# Patient Record
Sex: FEMALE | Race: WHITE
[De-identification: names, ages, dates, MRNs, and addresses within clinical notes are randomized per-mention and may not be internally consistent; named-entity substitution may affect disease eponyms.]

---

## 2018-03-21 ENCOUNTER — HOSPITAL ENCOUNTER (EMERGENCY)
Dept: HOSPITAL 92 - SCSER | Age: 63
Discharge: HOME | End: 2018-03-21
Payer: MEDICARE

## 2018-03-21 DIAGNOSIS — Z79.01: ICD-10-CM

## 2018-03-21 DIAGNOSIS — E11.9: ICD-10-CM

## 2018-03-21 DIAGNOSIS — S92.342A: ICD-10-CM

## 2018-03-21 DIAGNOSIS — K21.9: ICD-10-CM

## 2018-03-21 DIAGNOSIS — E78.5: ICD-10-CM

## 2018-03-21 DIAGNOSIS — Z85.41: ICD-10-CM

## 2018-03-21 DIAGNOSIS — Z79.4: ICD-10-CM
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
## 2018-03-21 DIAGNOSIS — I25.2: ICD-10-CM

## 2018-03-21 DIAGNOSIS — G43.909: ICD-10-CM

## 2018-03-21 DIAGNOSIS — Z79.899: ICD-10-CM
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
## 2018-03-21 DIAGNOSIS — F41.9: ICD-10-CM

## 2018-03-21 DIAGNOSIS — S92.332A: ICD-10-CM

## 2018-03-21 DIAGNOSIS — I10: ICD-10-CM

## 2018-03-21 DIAGNOSIS — F31.9: ICD-10-CM

## 2018-03-21 DIAGNOSIS — S92.322A: Primary | ICD-10-CM

## 2018-03-21 PROCEDURE — 28475 CLTX METATARSAL FX W/MNPJ EA: CPT

## 2018-03-21 PROCEDURE — 96372 THER/PROPH/DIAG INJ SC/IM: CPT

## 2018-05-27 ENCOUNTER — HOSPITAL ENCOUNTER (EMERGENCY)
Dept: HOSPITAL 92 - SCSER | Age: 63
Discharge: HOME | End: 2018-05-27
Payer: MEDICARE

## 2018-05-27 DIAGNOSIS — E78.5: ICD-10-CM

## 2018-05-27 DIAGNOSIS — Z79.4: ICD-10-CM

## 2018-05-27 DIAGNOSIS — Z79.01: ICD-10-CM

## 2018-05-27 DIAGNOSIS — G43.909: ICD-10-CM

## 2018-05-27 DIAGNOSIS — K21.9: ICD-10-CM

## 2018-05-27 DIAGNOSIS — Z85.41: ICD-10-CM

## 2018-05-27 DIAGNOSIS — E11.9: ICD-10-CM

## 2018-05-27 DIAGNOSIS — F32.9: ICD-10-CM

## 2018-05-27 DIAGNOSIS — F41.9: ICD-10-CM
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
## 2018-05-27 DIAGNOSIS — S22.41XA: Primary | ICD-10-CM

## 2018-05-27 DIAGNOSIS — Z79.899: ICD-10-CM

## 2018-05-27 DIAGNOSIS — I25.2: ICD-10-CM

## 2018-05-27 PROCEDURE — 96372 THER/PROPH/DIAG INJ SC/IM: CPT

## 2018-05-27 PROCEDURE — 64420 NJX AA&/STRD NTRCOST NRV 1: CPT

## 2018-05-27 PROCEDURE — 71250 CT THORAX DX C-: CPT

## 2018-05-31 ENCOUNTER — HOSPITAL ENCOUNTER (OUTPATIENT)
Dept: HOSPITAL 92 - BICMAMMO | Age: 63
Discharge: HOME | End: 2018-05-31
Attending: PHYSICIAN ASSISTANT
Payer: MEDICARE

## 2018-05-31 DIAGNOSIS — M85.88: ICD-10-CM

## 2018-05-31 DIAGNOSIS — M81.0: Primary | ICD-10-CM

## 2018-05-31 PROCEDURE — 77080 DXA BONE DENSITY AXIAL: CPT

## 2018-06-28 ENCOUNTER — HOSPITAL ENCOUNTER (EMERGENCY)
Dept: HOSPITAL 92 - SCSER | Age: 63
Discharge: HOME | End: 2018-06-28
Payer: MEDICARE

## 2018-06-28 ENCOUNTER — HOSPITAL ENCOUNTER (OUTPATIENT)
Dept: HOSPITAL 92 - SCSCT | Age: 63
Discharge: HOME | End: 2018-06-28
Attending: INTERNAL MEDICINE
Payer: MEDICARE

## 2018-06-28 DIAGNOSIS — R07.89: ICD-10-CM

## 2018-06-28 DIAGNOSIS — F41.9: ICD-10-CM

## 2018-06-28 DIAGNOSIS — F32.9: ICD-10-CM

## 2018-06-28 DIAGNOSIS — N39.0: Primary | ICD-10-CM

## 2018-06-28 DIAGNOSIS — D72.810: Primary | ICD-10-CM

## 2018-06-28 DIAGNOSIS — R59.0: ICD-10-CM

## 2018-06-28 DIAGNOSIS — E11.9: ICD-10-CM

## 2018-06-28 DIAGNOSIS — R11.2: ICD-10-CM

## 2018-06-28 DIAGNOSIS — K21.9: ICD-10-CM

## 2018-06-28 DIAGNOSIS — E78.5: ICD-10-CM

## 2018-06-28 LAB
ALBUMIN SERPL BCG-MCNC: 3.7 G/DL (ref 3.4–4.8)
ALP SERPL-CCNC: 81 U/L (ref 40–150)
ALT SERPL W P-5'-P-CCNC: 15 U/L (ref 8–55)
ANION GAP SERPL CALC-SCNC: 13 MMOL/L (ref 10–20)
AST SERPL-CCNC: 20 U/L (ref 5–34)
BACTERIA UR QL AUTO: (no result) HPF
BILIRUB SERPL-MCNC: 2 MG/DL (ref 0.2–1.2)
BUN SERPL-MCNC: 18 MG/DL (ref 9.8–20.1)
CALCIUM SERPL-MCNC: 8.9 MG/DL (ref 7.8–10.44)
CHLORIDE SERPL-SCNC: 105 MMOL/L (ref 98–107)
CK MB SERPL-MCNC: 0.9 NG/ML (ref 0–6.6)
CO2 SERPL-SCNC: 22 MMOL/L (ref 23–31)
CREAT CL PREDICTED SERPL C-G-VRATE: 0 ML/MIN (ref 70–130)
GLOBULIN SER CALC-MCNC: 3.2 G/DL (ref 2.4–3.5)
GLUCOSE SERPL-MCNC: 146 MG/DL (ref 80–115)
HGB BLD-MCNC: 13 G/DL (ref 12–16)
HYALINE CASTS #/AREA URNS LPF: (no result) LPF
LIPASE SERPL-CCNC: 18 U/L (ref 8–78)
MAGNESIUM SERPL-MCNC: 2.1 MG/DL (ref 1.6–2.6)
MCH RBC QN AUTO: 28.9 PG (ref 27–31)
MCV RBC AUTO: 81.7 FL (ref 78–98)
MDIFF COMPLETE?: YES
PLATELET # BLD AUTO: 163 THOU/UL (ref 130–400)
POTASSIUM SERPL-SCNC: 4.4 MMOL/L (ref 3.5–5.1)
PROT UR STRIP.AUTO-MCNC: (no result) MG/DL
RBC # BLD AUTO: 4.5 MILL/UL (ref 4.2–5.4)
RBC UR QL AUTO: (no result) HPF (ref 0–3)
SODIUM SERPL-SCNC: 136 MMOL/L (ref 136–145)
SP GR UR STRIP: 1.02 (ref 1–1.03)
TROPONIN I SERPL DL<=0.01 NG/ML-MCNC: (no result) NG/ML (ref ?–0.03)
WBC # BLD AUTO: 25.4 THOU/UL (ref 4.8–10.8)
WBC UR QL AUTO: (no result) HPF (ref 0–3)

## 2018-06-28 PROCEDURE — 81003 URINALYSIS AUTO W/O SCOPE: CPT

## 2018-06-28 PROCEDURE — 87086 URINE CULTURE/COLONY COUNT: CPT

## 2018-06-28 PROCEDURE — 87081 CULTURE SCREEN ONLY: CPT

## 2018-06-28 PROCEDURE — 80053 COMPREHEN METABOLIC PANEL: CPT

## 2018-06-28 PROCEDURE — 87040 BLOOD CULTURE FOR BACTERIA: CPT

## 2018-06-28 PROCEDURE — 84484 ASSAY OF TROPONIN QUANT: CPT

## 2018-06-28 PROCEDURE — 85025 COMPLETE CBC W/AUTO DIFF WBC: CPT

## 2018-06-28 PROCEDURE — 83690 ASSAY OF LIPASE: CPT

## 2018-06-28 PROCEDURE — 81015 MICROSCOPIC EXAM OF URINE: CPT

## 2018-06-28 PROCEDURE — 74177 CT ABD & PELVIS W/CONTRAST: CPT

## 2018-06-28 PROCEDURE — 83605 ASSAY OF LACTIC ACID: CPT

## 2018-06-28 PROCEDURE — 96361 HYDRATE IV INFUSION ADD-ON: CPT

## 2018-06-28 PROCEDURE — 87430 STREP A AG IA: CPT

## 2018-06-28 PROCEDURE — 96374 THER/PROPH/DIAG INJ IV PUSH: CPT

## 2018-06-28 PROCEDURE — 93005 ELECTROCARDIOGRAM TRACING: CPT

## 2018-06-28 PROCEDURE — 71045 X-RAY EXAM CHEST 1 VIEW: CPT

## 2018-06-28 PROCEDURE — 83735 ASSAY OF MAGNESIUM: CPT

## 2018-06-28 PROCEDURE — 82553 CREATINE MB FRACTION: CPT

## 2018-06-28 PROCEDURE — 96375 TX/PRO/DX INJ NEW DRUG ADDON: CPT

## 2018-08-15 ENCOUNTER — HOSPITAL ENCOUNTER (OUTPATIENT)
Dept: HOSPITAL 92 - BICMAMMO | Age: 63
Discharge: HOME | End: 2018-08-15
Attending: FAMILY MEDICINE
Payer: MEDICARE

## 2018-08-15 DIAGNOSIS — Z12.31: Primary | ICD-10-CM

## 2018-08-15 DIAGNOSIS — Z80.3: ICD-10-CM

## 2018-08-15 PROCEDURE — 77067 SCR MAMMO BI INCL CAD: CPT

## 2018-08-15 PROCEDURE — 77063 BREAST TOMOSYNTHESIS BI: CPT

## 2018-09-24 ENCOUNTER — HOSPITAL ENCOUNTER (EMERGENCY)
Dept: HOSPITAL 92 - SCSER | Age: 63
Discharge: HOME | End: 2018-09-24
Payer: MEDICARE

## 2018-09-24 DIAGNOSIS — F41.9: ICD-10-CM

## 2018-09-24 DIAGNOSIS — E78.5: ICD-10-CM

## 2018-09-24 DIAGNOSIS — K21.9: ICD-10-CM

## 2018-09-24 DIAGNOSIS — R05: Primary | ICD-10-CM

## 2018-09-24 DIAGNOSIS — G43.909: ICD-10-CM

## 2018-09-24 DIAGNOSIS — F32.9: ICD-10-CM

## 2018-09-24 DIAGNOSIS — E11.9: ICD-10-CM

## 2018-09-24 DIAGNOSIS — I95.9: ICD-10-CM

## 2018-09-24 LAB
ALBUMIN SERPL BCG-MCNC: 3.7 G/DL (ref 3.4–4.8)
ALP SERPL-CCNC: 119 U/L (ref 40–150)
ALT SERPL W P-5'-P-CCNC: 15 U/L (ref 8–55)
ANION GAP SERPL CALC-SCNC: 14 MMOL/L (ref 10–20)
AST SERPL-CCNC: 23 U/L (ref 5–34)
BILIRUB SERPL-MCNC: 0.7 MG/DL (ref 0.2–1.2)
BUN SERPL-MCNC: 19 MG/DL (ref 9.8–20.1)
CALCIUM SERPL-MCNC: 9.5 MG/DL (ref 7.8–10.44)
CHLORIDE SERPL-SCNC: 105 MMOL/L (ref 98–107)
CK MB SERPL-MCNC: 2.1 NG/ML (ref 0–6.6)
CO2 SERPL-SCNC: 22 MMOL/L (ref 23–31)
CREAT CL PREDICTED SERPL C-G-VRATE: 0 ML/MIN (ref 70–130)
GLOBULIN SER CALC-MCNC: 3.3 G/DL (ref 2.4–3.5)
GLUCOSE SERPL-MCNC: 328 MG/DL (ref 80–115)
HGB BLD-MCNC: 12.7 G/DL (ref 12–16)
MCH RBC QN AUTO: 28.5 PG (ref 27–31)
MCV RBC AUTO: 84.7 FL (ref 78–98)
MDIFF COMPLETE?: YES
PLATELET # BLD AUTO: 185 THOU/UL (ref 130–400)
PLATELET BLD QL SMEAR: (no result)
POTASSIUM SERPL-SCNC: 5.2 MMOL/L (ref 3.5–5.1)
RBC # BLD AUTO: 4.44 MILL/UL (ref 4.2–5.4)
SODIUM SERPL-SCNC: 136 MMOL/L (ref 136–145)
TROPONIN I SERPL DL<=0.01 NG/ML-MCNC: (no result) NG/ML (ref ?–0.03)
WBC # BLD AUTO: 21.5 THOU/UL (ref 4.8–10.8)

## 2018-09-24 PROCEDURE — 71046 X-RAY EXAM CHEST 2 VIEWS: CPT

## 2018-09-24 PROCEDURE — 80053 COMPREHEN METABOLIC PANEL: CPT

## 2018-09-24 PROCEDURE — 93005 ELECTROCARDIOGRAM TRACING: CPT

## 2018-09-24 PROCEDURE — 82553 CREATINE MB FRACTION: CPT

## 2018-09-24 PROCEDURE — 36415 COLL VENOUS BLD VENIPUNCTURE: CPT

## 2018-09-24 PROCEDURE — 85025 COMPLETE CBC W/AUTO DIFF WBC: CPT

## 2018-09-24 PROCEDURE — 84484 ASSAY OF TROPONIN QUANT: CPT

## 2019-01-03 ENCOUNTER — HOSPITAL ENCOUNTER (OUTPATIENT)
Dept: HOSPITAL 92 - BICULT | Age: 64
Discharge: HOME | End: 2019-01-03
Attending: INTERNAL MEDICINE
Payer: MEDICARE

## 2019-01-03 DIAGNOSIS — R16.1: Primary | ICD-10-CM

## 2019-01-03 DIAGNOSIS — D72.810: ICD-10-CM

## 2019-01-03 DIAGNOSIS — C91.10: ICD-10-CM

## 2019-01-03 PROCEDURE — 76705 ECHO EXAM OF ABDOMEN: CPT

## 2019-02-19 ENCOUNTER — HOSPITAL ENCOUNTER (EMERGENCY)
Dept: HOSPITAL 92 - SCSER | Age: 64
Discharge: HOME | End: 2019-02-19
Payer: MEDICARE

## 2019-02-19 DIAGNOSIS — N39.0: Primary | ICD-10-CM

## 2019-02-19 DIAGNOSIS — G43.909: ICD-10-CM

## 2019-02-19 DIAGNOSIS — F41.9: ICD-10-CM

## 2019-02-19 DIAGNOSIS — I95.9: ICD-10-CM

## 2019-02-19 DIAGNOSIS — R05: ICD-10-CM

## 2019-02-19 DIAGNOSIS — F32.9: ICD-10-CM

## 2019-02-19 DIAGNOSIS — K21.9: ICD-10-CM

## 2019-02-19 DIAGNOSIS — R09.81: ICD-10-CM

## 2019-02-19 DIAGNOSIS — E11.9: ICD-10-CM

## 2019-02-19 DIAGNOSIS — E78.5: ICD-10-CM

## 2019-02-19 LAB
ALBUMIN SERPL BCG-MCNC: 3.6 G/DL (ref 3.4–4.8)
ALP SERPL-CCNC: 83 U/L (ref 40–150)
ALT SERPL W P-5'-P-CCNC: 21 U/L (ref 8–55)
ANION GAP SERPL CALC-SCNC: 13 MMOL/L (ref 10–20)
AST SERPL-CCNC: 26 U/L (ref 5–34)
BACTERIA UR QL AUTO: (no result) HPF
BILIRUB SERPL-MCNC: 1.1 MG/DL (ref 0.2–1.2)
BUN SERPL-MCNC: 18 MG/DL (ref 9.8–20.1)
CALCIUM SERPL-MCNC: 9.6 MG/DL (ref 7.8–10.44)
CHLORIDE SERPL-SCNC: 107 MMOL/L (ref 98–107)
CO2 SERPL-SCNC: 23 MMOL/L (ref 23–31)
CREAT CL PREDICTED SERPL C-G-VRATE: 0 ML/MIN (ref 70–130)
GLOBULIN SER CALC-MCNC: 3 G/DL (ref 2.4–3.5)
GLUCOSE SERPL-MCNC: 129 MG/DL (ref 80–115)
HGB BLD-MCNC: 13.2 G/DL (ref 12–16)
HYALINE CASTS #/AREA URNS LPF: (no result) LPF
MCH RBC QN AUTO: 27.4 PG (ref 27–31)
MCV RBC AUTO: 85.3 FL (ref 78–98)
MDIFF COMPLETE?: YES
PLATELET # BLD AUTO: 203 THOU/UL (ref 130–400)
POTASSIUM SERPL-SCNC: 4.2 MMOL/L (ref 3.5–5.1)
PROT UR STRIP.AUTO-MCNC: 100 MG/DL
RBC # BLD AUTO: 4.81 MILL/UL (ref 4.2–5.4)
RBC UR QL AUTO: (no result) HPF (ref 0–3)
SODIUM SERPL-SCNC: 139 MMOL/L (ref 136–145)
SP GR UR STRIP: 1.01 (ref 1–1.03)
WBC # BLD AUTO: 34.4 THOU/UL (ref 4.8–10.8)
WBC UR QL AUTO: (no result) HPF (ref 0–3)

## 2019-02-19 PROCEDURE — 84484 ASSAY OF TROPONIN QUANT: CPT

## 2019-02-19 PROCEDURE — 87081 CULTURE SCREEN ONLY: CPT

## 2019-02-19 PROCEDURE — 85060 BLOOD SMEAR INTERPRETATION: CPT

## 2019-02-19 PROCEDURE — 80053 COMPREHEN METABOLIC PANEL: CPT

## 2019-02-19 PROCEDURE — 81015 MICROSCOPIC EXAM OF URINE: CPT

## 2019-02-19 PROCEDURE — 36415 COLL VENOUS BLD VENIPUNCTURE: CPT

## 2019-02-19 PROCEDURE — 87086 URINE CULTURE/COLONY COUNT: CPT

## 2019-02-19 PROCEDURE — 87430 STREP A AG IA: CPT

## 2019-02-19 PROCEDURE — 81003 URINALYSIS AUTO W/O SCOPE: CPT

## 2019-02-19 PROCEDURE — 87804 INFLUENZA ASSAY W/OPTIC: CPT

## 2019-02-19 PROCEDURE — 93005 ELECTROCARDIOGRAM TRACING: CPT

## 2019-02-19 PROCEDURE — 71046 X-RAY EXAM CHEST 2 VIEWS: CPT

## 2019-02-19 PROCEDURE — 85025 COMPLETE CBC W/AUTO DIFF WBC: CPT

## 2019-05-07 ENCOUNTER — HOSPITAL ENCOUNTER (OUTPATIENT)
Dept: HOSPITAL 92 - BICCT | Age: 64
Discharge: HOME | End: 2019-05-07
Attending: INTERNAL MEDICINE
Payer: MEDICARE

## 2019-05-07 DIAGNOSIS — C91.10: Primary | ICD-10-CM

## 2019-05-07 DIAGNOSIS — R10.13: ICD-10-CM

## 2019-05-07 DIAGNOSIS — R16.1: ICD-10-CM

## 2019-05-07 DIAGNOSIS — R59.0: ICD-10-CM

## 2019-05-07 DIAGNOSIS — K80.20: ICD-10-CM

## 2019-05-07 DIAGNOSIS — I70.90: ICD-10-CM

## 2019-05-07 DIAGNOSIS — R68.81: ICD-10-CM

## 2019-05-07 LAB — ESTIMATED GFR-MDRD - POC: 50

## 2019-05-07 PROCEDURE — 82565 ASSAY OF CREATININE: CPT

## 2019-05-07 PROCEDURE — 74177 CT ABD & PELVIS W/CONTRAST: CPT

## 2019-08-20 ENCOUNTER — HOSPITAL ENCOUNTER (OUTPATIENT)
Dept: HOSPITAL 92 - BICMAMMO | Age: 64
Discharge: HOME | End: 2019-08-20
Attending: FAMILY MEDICINE
Payer: MEDICARE

## 2019-08-20 DIAGNOSIS — Z85.6: ICD-10-CM

## 2019-08-20 DIAGNOSIS — Z12.31: Primary | ICD-10-CM

## 2019-08-20 PROCEDURE — 77063 BREAST TOMOSYNTHESIS BI: CPT

## 2019-08-20 PROCEDURE — 77067 SCR MAMMO BI INCL CAD: CPT

## 2020-03-12 ENCOUNTER — HOSPITAL ENCOUNTER (INPATIENT)
Dept: HOSPITAL 92 - ERS | Age: 65
LOS: 12 days | Discharge: HOME HEALTH | DRG: 853 | End: 2020-03-24
Attending: HOSPITALIST | Admitting: HOSPITALIST
Payer: MEDICARE

## 2020-03-12 VITALS — BODY MASS INDEX: 27.4 KG/M2

## 2020-03-12 DIAGNOSIS — Z79.01: ICD-10-CM

## 2020-03-12 DIAGNOSIS — Z79.899: ICD-10-CM

## 2020-03-12 DIAGNOSIS — M79.7: ICD-10-CM

## 2020-03-12 DIAGNOSIS — E11.9: ICD-10-CM

## 2020-03-12 DIAGNOSIS — E78.00: ICD-10-CM

## 2020-03-12 DIAGNOSIS — R65.21: ICD-10-CM

## 2020-03-12 DIAGNOSIS — Z88.0: ICD-10-CM

## 2020-03-12 DIAGNOSIS — A04.72: ICD-10-CM

## 2020-03-12 DIAGNOSIS — Z79.4: ICD-10-CM

## 2020-03-12 DIAGNOSIS — Z90.710: ICD-10-CM

## 2020-03-12 DIAGNOSIS — Z90.49: ICD-10-CM

## 2020-03-12 DIAGNOSIS — Z98.51: ICD-10-CM

## 2020-03-12 DIAGNOSIS — N17.9: ICD-10-CM

## 2020-03-12 DIAGNOSIS — Z88.8: ICD-10-CM

## 2020-03-12 DIAGNOSIS — Z88.1: ICD-10-CM

## 2020-03-12 DIAGNOSIS — F41.9: ICD-10-CM

## 2020-03-12 DIAGNOSIS — N13.6: ICD-10-CM

## 2020-03-12 DIAGNOSIS — J81.1: ICD-10-CM

## 2020-03-12 DIAGNOSIS — K21.9: ICD-10-CM

## 2020-03-12 DIAGNOSIS — E78.5: ICD-10-CM

## 2020-03-12 DIAGNOSIS — C91.10: ICD-10-CM

## 2020-03-12 DIAGNOSIS — I48.91: ICD-10-CM

## 2020-03-12 DIAGNOSIS — G43.909: ICD-10-CM

## 2020-03-12 DIAGNOSIS — A41.81: Primary | ICD-10-CM

## 2020-03-12 DIAGNOSIS — E87.2: ICD-10-CM

## 2020-03-12 DIAGNOSIS — F32.9: ICD-10-CM

## 2020-03-12 LAB
ALBUMIN SERPL BCG-MCNC: 3.2 G/DL (ref 3.4–4.8)
ALP SERPL-CCNC: 91 U/L (ref 40–110)
ALT SERPL W P-5'-P-CCNC: 24 U/L (ref 8–55)
ANION GAP SERPL CALC-SCNC: 12 MMOL/L (ref 10–20)
AST SERPL-CCNC: 23 U/L (ref 5–34)
BACTERIA UR QL AUTO: (no result) HPF
BILIRUB SERPL-MCNC: 1.2 MG/DL (ref 0.2–1.2)
BUN SERPL-MCNC: 14 MG/DL (ref 9.8–20.1)
CALCIUM SERPL-MCNC: 8.3 MG/DL (ref 7.8–10.44)
CHLORIDE SERPL-SCNC: 106 MMOL/L (ref 98–107)
CK SERPL-CCNC: 55 U/L (ref 29–168)
CO2 SERPL-SCNC: 22 MMOL/L (ref 23–31)
CREAT CL PREDICTED SERPL C-G-VRATE: 0 ML/MIN (ref 70–130)
GLOBULIN SER CALC-MCNC: 2.5 G/DL (ref 2.4–3.5)
GLUCOSE SERPL-MCNC: 337 MG/DL (ref 80–115)
HGB BLD-MCNC: 10 G/DL (ref 12–16)
LEUKOCYTE ESTERASE UR QL STRIP.AUTO: 500 LEU/UL
MCH RBC QN AUTO: 30.9 PG (ref 27–31)
MCV RBC AUTO: 86.8 FL (ref 78–98)
MDIFF COMPLETE?: YES
PLATELET # BLD AUTO: 113 THOU/UL (ref 130–400)
POTASSIUM SERPL-SCNC: 3.5 MMOL/L (ref 3.5–5.1)
PROT UR STRIP.AUTO-MCNC: 100 MG/DL
RBC # BLD AUTO: 3.23 MILL/UL (ref 4.2–5.4)
SODIUM SERPL-SCNC: 136 MMOL/L (ref 136–145)
WBC # BLD AUTO: 13.6 THOU/UL (ref 4.8–10.8)

## 2020-03-12 PROCEDURE — A4353 INTERMITTENT URINARY CATH: HCPCS

## 2020-03-12 PROCEDURE — 74176 CT ABD & PELVIS W/O CONTRAST: CPT

## 2020-03-12 PROCEDURE — 96365 THER/PROPH/DIAG IV INF INIT: CPT

## 2020-03-12 PROCEDURE — 96375 TX/PRO/DX INJ NEW DRUG ADDON: CPT

## 2020-03-12 PROCEDURE — 80202 ASSAY OF VANCOMYCIN: CPT

## 2020-03-12 PROCEDURE — 87804 INFLUENZA ASSAY W/OPTIC: CPT

## 2020-03-12 PROCEDURE — 93005 ELECTROCARDIOGRAM TRACING: CPT

## 2020-03-12 PROCEDURE — 83615 LACTATE (LD) (LDH) ENZYME: CPT

## 2020-03-12 PROCEDURE — 71045 X-RAY EXAM CHEST 1 VIEW: CPT

## 2020-03-12 PROCEDURE — 87040 BLOOD CULTURE FOR BACTERIA: CPT

## 2020-03-12 PROCEDURE — 36569 INSJ PICC 5 YR+ W/O IMAGING: CPT

## 2020-03-12 PROCEDURE — 87086 URINE CULTURE/COLONY COUNT: CPT

## 2020-03-12 PROCEDURE — 36416 COLLJ CAPILLARY BLOOD SPEC: CPT

## 2020-03-12 PROCEDURE — 84100 ASSAY OF PHOSPHORUS: CPT

## 2020-03-12 PROCEDURE — 82248 BILIRUBIN DIRECT: CPT

## 2020-03-12 PROCEDURE — 87324 CLOSTRIDIUM AG IA: CPT

## 2020-03-12 PROCEDURE — 96366 THER/PROPH/DIAG IV INF ADDON: CPT

## 2020-03-12 PROCEDURE — S0020 INJECTION, BUPIVICAINE HYDRO: HCPCS

## 2020-03-12 PROCEDURE — 80048 BASIC METABOLIC PNL TOTAL CA: CPT

## 2020-03-12 PROCEDURE — 51701 INSERT BLADDER CATHETER: CPT

## 2020-03-12 PROCEDURE — 96368 THER/DIAG CONCURRENT INF: CPT

## 2020-03-12 PROCEDURE — 81003 URINALYSIS AUTO W/O SCOPE: CPT

## 2020-03-12 PROCEDURE — 99213 OFFICE O/P EST LOW 20 MIN: CPT

## 2020-03-12 PROCEDURE — 85007 BL SMEAR W/DIFF WBC COUNT: CPT

## 2020-03-12 PROCEDURE — C1751 CATH, INF, PER/CENT/MIDLINE: HCPCS

## 2020-03-12 PROCEDURE — 83880 ASSAY OF NATRIURETIC PEPTIDE: CPT

## 2020-03-12 PROCEDURE — G0463 HOSPITAL OUTPT CLINIC VISIT: HCPCS

## 2020-03-12 PROCEDURE — 84550 ASSAY OF BLOOD/URIC ACID: CPT

## 2020-03-12 PROCEDURE — 87186 SC STD MICRODIL/AGAR DIL: CPT

## 2020-03-12 PROCEDURE — C1788 PORT, INDWELLING, IMP: HCPCS

## 2020-03-12 PROCEDURE — 93306 TTE W/DOPPLER COMPLETE: CPT

## 2020-03-12 PROCEDURE — 81015 MICROSCOPIC EXAM OF URINE: CPT

## 2020-03-12 PROCEDURE — 99292 CRITICAL CARE ADDL 30 MIN: CPT

## 2020-03-12 PROCEDURE — 80053 COMPREHEN METABOLIC PANEL: CPT

## 2020-03-12 PROCEDURE — S0028 INJECTION, FAMOTIDINE, 20 MG: HCPCS

## 2020-03-12 PROCEDURE — 85025 COMPLETE CBC W/AUTO DIFF WBC: CPT

## 2020-03-12 PROCEDURE — 87449 NOS EACH ORGANISM AG IA: CPT

## 2020-03-12 PROCEDURE — 96361 HYDRATE IV INFUSION ADD-ON: CPT

## 2020-03-12 PROCEDURE — 82550 ASSAY OF CK (CPK): CPT

## 2020-03-12 PROCEDURE — P9047 ALBUMIN (HUMAN), 25%, 50ML: HCPCS

## 2020-03-12 PROCEDURE — 87077 CULTURE AEROBIC IDENTIFY: CPT

## 2020-03-12 PROCEDURE — 87149 DNA/RNA DIRECT PROBE: CPT

## 2020-03-12 PROCEDURE — 36415 COLL VENOUS BLD VENIPUNCTURE: CPT

## 2020-03-12 PROCEDURE — 83605 ASSAY OF LACTIC ACID: CPT

## 2020-03-12 PROCEDURE — 85027 COMPLETE CBC AUTOMATED: CPT

## 2020-03-13 LAB
ANION GAP SERPL CALC-SCNC: 11 MMOL/L (ref 10–20)
BUN SERPL-MCNC: 17 MG/DL (ref 9.8–20.1)
CALCIUM SERPL-MCNC: 7.1 MG/DL (ref 7.8–10.44)
CHLORIDE SERPL-SCNC: 111 MMOL/L (ref 98–107)
CO2 SERPL-SCNC: 19 MMOL/L (ref 23–31)
CREAT CL PREDICTED SERPL C-G-VRATE: 26 ML/MIN (ref 70–130)
GLUCOSE SERPL-MCNC: 169 MG/DL (ref 80–115)
HGB BLD-MCNC: 9.5 G/DL (ref 12–16)
MCH RBC QN AUTO: 30.4 PG (ref 27–31)
MCV RBC AUTO: 88.2 FL (ref 78–98)
MDIFF COMPLETE?: YES
PLATELET # BLD AUTO: 128 THOU/UL (ref 130–400)
POLYCHROMASIA BLD QL SMEAR: (no result) (100X)
POTASSIUM SERPL-SCNC: 3.4 MMOL/L (ref 3.5–5.1)
RBC # BLD AUTO: 3.12 MILL/UL (ref 4.2–5.4)
SODIUM SERPL-SCNC: 138 MMOL/L (ref 136–145)
WBC # BLD AUTO: 32.1 THOU/UL (ref 4.8–10.8)

## 2020-03-13 PROCEDURE — 02HV33Z INSERTION OF INFUSION DEVICE INTO SUPERIOR VENA CAVA, PERCUTANEOUS APPROACH: ICD-10-PCS | Performed by: HOSPITALIST

## 2020-03-13 PROCEDURE — 3E043XZ INTRODUCTION OF VASOPRESSOR INTO CENTRAL VEIN, PERCUTANEOUS APPROACH: ICD-10-PCS | Performed by: HOSPITALIST

## 2020-03-13 RX ADMIN — Medication SCH ML: at 20:17

## 2020-03-13 RX ADMIN — MEROPENEM AND SODIUM CHLORIDE SCH MLS: 1 INJECTION, SOLUTION INTRAVENOUS at 21:07

## 2020-03-13 RX ADMIN — Medication SCH ML: at 14:11

## 2020-03-13 RX ADMIN — FAMOTIDINE SCH MG: 10 INJECTION, SOLUTION INTRAVENOUS at 14:09

## 2020-03-13 RX ADMIN — INSULIN LISPRO PRN UNIT: 100 INJECTION, SOLUTION INTRAVENOUS; SUBCUTANEOUS at 21:31

## 2020-03-14 LAB
ANION GAP SERPL CALC-SCNC: 8 MMOL/L (ref 10–20)
BUN SERPL-MCNC: 19 MG/DL (ref 9.8–20.1)
CALCIUM SERPL-MCNC: 6.9 MG/DL (ref 7.8–10.44)
CHLORIDE SERPL-SCNC: 113 MMOL/L (ref 98–107)
CO2 SERPL-SCNC: 20 MMOL/L (ref 23–31)
CREAT CL PREDICTED SERPL C-G-VRATE: 30 ML/MIN (ref 70–130)
GLUCOSE SERPL-MCNC: 170 MG/DL (ref 80–115)
HGB BLD-MCNC: 9.6 G/DL (ref 12–16)
MCH RBC QN AUTO: 30.9 PG (ref 27–31)
MCV RBC AUTO: 88.7 FL (ref 78–98)
MDIFF COMPLETE?: YES
PLATELET # BLD AUTO: 103 THOU/UL (ref 130–400)
POTASSIUM SERPL-SCNC: 3.7 MMOL/L (ref 3.5–5.1)
RBC # BLD AUTO: 3.11 MILL/UL (ref 4.2–5.4)
SODIUM SERPL-SCNC: 137 MMOL/L (ref 136–145)
VANCOMYCIN TROUGH SERPL-MCNC: 12.1 UG/ML
WBC # BLD AUTO: 16.1 THOU/UL (ref 4.8–10.8)

## 2020-03-14 RX ADMIN — INSULIN LISPRO PRN UNIT: 100 INJECTION, SOLUTION INTRAVENOUS; SUBCUTANEOUS at 05:58

## 2020-03-14 RX ADMIN — MEROPENEM AND SODIUM CHLORIDE SCH MLS: 1 INJECTION, SOLUTION INTRAVENOUS at 05:53

## 2020-03-14 RX ADMIN — VANCOMYCIN HYDROCHLORIDE SCH MLS: 1 INJECTION, SOLUTION INTRAVENOUS at 23:51

## 2020-03-14 RX ADMIN — Medication SCH ML: at 08:42

## 2020-03-14 RX ADMIN — FAMOTIDINE SCH MG: 10 INJECTION, SOLUTION INTRAVENOUS at 08:42

## 2020-03-14 RX ADMIN — INSULIN LISPRO PRN UNIT: 100 INJECTION, SOLUTION INTRAVENOUS; SUBCUTANEOUS at 11:41

## 2020-03-14 RX ADMIN — Medication SCH ML: at 20:02

## 2020-03-14 RX ADMIN — INSULIN LISPRO PRN UNIT: 100 INJECTION, SOLUTION INTRAVENOUS; SUBCUTANEOUS at 20:01

## 2020-03-15 LAB
ANION GAP SERPL CALC-SCNC: 9 MMOL/L (ref 10–20)
BUN SERPL-MCNC: 16 MG/DL (ref 9.8–20.1)
CALCIUM SERPL-MCNC: 7.2 MG/DL (ref 7.8–10.44)
CHLORIDE SERPL-SCNC: 115 MMOL/L (ref 98–107)
CO2 SERPL-SCNC: 19 MMOL/L (ref 23–31)
CREAT CL PREDICTED SERPL C-G-VRATE: 38 ML/MIN (ref 70–130)
GLUCOSE SERPL-MCNC: 183 MG/DL (ref 80–115)
HGB BLD-MCNC: 9.6 G/DL (ref 12–16)
MCH RBC QN AUTO: 31 PG (ref 27–31)
MCV RBC AUTO: 88.4 FL (ref 78–98)
MDIFF COMPLETE?: YES
PLATELET # BLD AUTO: 130 THOU/UL (ref 130–400)
POTASSIUM SERPL-SCNC: 4.2 MMOL/L (ref 3.5–5.1)
RBC # BLD AUTO: 3.09 MILL/UL (ref 4.2–5.4)
SODIUM SERPL-SCNC: 139 MMOL/L (ref 136–145)
WBC # BLD AUTO: 15.8 THOU/UL (ref 4.8–10.8)

## 2020-03-15 RX ADMIN — INSULIN LISPRO PRN UNIT: 100 INJECTION, SOLUTION INTRAVENOUS; SUBCUTANEOUS at 06:24

## 2020-03-15 RX ADMIN — Medication SCH ML: at 23:05

## 2020-03-15 RX ADMIN — FAMOTIDINE SCH MG: 10 INJECTION, SOLUTION INTRAVENOUS at 09:22

## 2020-03-15 RX ADMIN — INSULIN LISPRO PRN UNIT: 100 INJECTION, SOLUTION INTRAVENOUS; SUBCUTANEOUS at 20:48

## 2020-03-15 RX ADMIN — VANCOMYCIN HYDROCHLORIDE SCH MLS: 1 INJECTION, SOLUTION INTRAVENOUS at 23:04

## 2020-03-15 RX ADMIN — INSULIN LISPRO PRN UNIT: 100 INJECTION, SOLUTION INTRAVENOUS; SUBCUTANEOUS at 17:07

## 2020-03-15 RX ADMIN — Medication SCH ML: at 09:22

## 2020-03-16 LAB
ANION GAP SERPL CALC-SCNC: 8 MMOL/L (ref 10–20)
BUN SERPL-MCNC: 12 MG/DL (ref 9.8–20.1)
CALCIUM SERPL-MCNC: 7.3 MG/DL (ref 7.8–10.44)
CHLORIDE SERPL-SCNC: 111 MMOL/L (ref 98–107)
CO2 SERPL-SCNC: 22 MMOL/L (ref 23–31)
CREAT CL PREDICTED SERPL C-G-VRATE: 51 ML/MIN (ref 70–130)
GLUCOSE SERPL-MCNC: 163 MG/DL (ref 80–115)
HGB BLD-MCNC: 9.8 G/DL (ref 12–16)
MCH RBC QN AUTO: 31 PG (ref 27–31)
MCV RBC AUTO: 89.5 FL (ref 78–98)
MDIFF COMPLETE?: YES
PLATELET # BLD AUTO: 155 THOU/UL (ref 130–400)
POLYCHROMASIA BLD QL SMEAR: (no result) (100X)
POTASSIUM SERPL-SCNC: 3.2 MMOL/L (ref 3.5–5.1)
RBC # BLD AUTO: 3.15 MILL/UL (ref 4.2–5.4)
SODIUM SERPL-SCNC: 138 MMOL/L (ref 136–145)
VANCOMYCIN TROUGH SERPL-MCNC: 15.6 UG/ML
WBC # BLD AUTO: 16.5 THOU/UL (ref 4.8–10.8)

## 2020-03-16 RX ADMIN — INSULIN LISPRO PRN UNIT: 100 INJECTION, SOLUTION INTRAVENOUS; SUBCUTANEOUS at 20:48

## 2020-03-16 RX ADMIN — INSULIN LISPRO PRN UNIT: 100 INJECTION, SOLUTION INTRAVENOUS; SUBCUTANEOUS at 05:25

## 2020-03-16 RX ADMIN — Medication SCH ML: at 08:14

## 2020-03-16 RX ADMIN — INSULIN LISPRO PRN UNIT: 100 INJECTION, SOLUTION INTRAVENOUS; SUBCUTANEOUS at 12:17

## 2020-03-16 RX ADMIN — Medication SCH ML: at 20:48

## 2020-03-16 RX ADMIN — INSULIN LISPRO PRN UNIT: 100 INJECTION, SOLUTION INTRAVENOUS; SUBCUTANEOUS at 18:01

## 2020-03-16 RX ADMIN — FAMOTIDINE SCH MG: 10 INJECTION, SOLUTION INTRAVENOUS at 08:13

## 2020-03-17 LAB
ANION GAP SERPL CALC-SCNC: 10 MMOL/L (ref 10–20)
BUN SERPL-MCNC: 10 MG/DL (ref 9.8–20.1)
CALCIUM SERPL-MCNC: 7.7 MG/DL (ref 7.8–10.44)
CHLORIDE SERPL-SCNC: 106 MMOL/L (ref 98–107)
CO2 SERPL-SCNC: 26 MMOL/L (ref 23–31)
CREAT CL PREDICTED SERPL C-G-VRATE: 51 ML/MIN (ref 70–130)
GLUCOSE SERPL-MCNC: 192 MG/DL (ref 80–115)
HGB BLD-MCNC: 9.8 G/DL (ref 12–16)
MCH RBC QN AUTO: 30.4 PG (ref 27–31)
MCV RBC AUTO: 88.4 FL (ref 78–98)
MDIFF COMPLETE?: YES
PLATELET # BLD AUTO: 161 THOU/UL (ref 130–400)
POTASSIUM SERPL-SCNC: 3.2 MMOL/L (ref 3.5–5.1)
RBC # BLD AUTO: 3.21 MILL/UL (ref 4.2–5.4)
SODIUM SERPL-SCNC: 139 MMOL/L (ref 136–145)
WBC # BLD AUTO: 16.3 THOU/UL (ref 4.8–10.8)

## 2020-03-17 RX ADMIN — VANCOMYCIN HYDROCHLORIDE SCH MLS: 1 INJECTION, POWDER, LYOPHILIZED, FOR SOLUTION INTRAVENOUS at 00:17

## 2020-03-17 RX ADMIN — INSULIN LISPRO PRN UNIT: 100 INJECTION, SOLUTION INTRAVENOUS; SUBCUTANEOUS at 05:37

## 2020-03-17 RX ADMIN — Medication SCH ML: at 20:51

## 2020-03-17 RX ADMIN — INSULIN LISPRO PRN UNIT: 100 INJECTION, SOLUTION INTRAVENOUS; SUBCUTANEOUS at 16:37

## 2020-03-17 RX ADMIN — Medication SCH ML: at 08:49

## 2020-03-17 RX ADMIN — INSULIN LISPRO PRN UNIT: 100 INJECTION, SOLUTION INTRAVENOUS; SUBCUTANEOUS at 20:51

## 2020-03-17 RX ADMIN — FAMOTIDINE SCH MG: 10 INJECTION, SOLUTION INTRAVENOUS at 08:48

## 2020-03-18 LAB
ANION GAP SERPL CALC-SCNC: 9 MMOL/L (ref 10–20)
BUN SERPL-MCNC: 11 MG/DL (ref 9.8–20.1)
CALCIUM SERPL-MCNC: 8 MG/DL (ref 7.8–10.44)
CHLORIDE SERPL-SCNC: 106 MMOL/L (ref 98–107)
CO2 SERPL-SCNC: 26 MMOL/L (ref 23–31)
CREAT CL PREDICTED SERPL C-G-VRATE: 54 ML/MIN (ref 70–130)
GLUCOSE SERPL-MCNC: 204 MG/DL (ref 80–115)
HGB BLD-MCNC: 9.1 G/DL (ref 12–16)
MCH RBC QN AUTO: 31.1 PG (ref 27–31)
MCV RBC AUTO: 88.6 FL (ref 78–98)
MDIFF COMPLETE?: YES
PLATELET # BLD AUTO: 155 THOU/UL (ref 130–400)
POTASSIUM SERPL-SCNC: 3.9 MMOL/L (ref 3.5–5.1)
RBC # BLD AUTO: 2.93 MILL/UL (ref 4.2–5.4)
SODIUM SERPL-SCNC: 137 MMOL/L (ref 136–145)
WBC # BLD AUTO: 12.6 THOU/UL (ref 4.8–10.8)

## 2020-03-18 RX ADMIN — INSULIN LISPRO PRN UNIT: 100 INJECTION, SOLUTION INTRAVENOUS; SUBCUTANEOUS at 06:01

## 2020-03-18 RX ADMIN — VANCOMYCIN HYDROCHLORIDE SCH MG: KIT at 12:40

## 2020-03-18 RX ADMIN — VANCOMYCIN HYDROCHLORIDE SCH MLS: 1 INJECTION, POWDER, LYOPHILIZED, FOR SOLUTION INTRAVENOUS at 00:40

## 2020-03-18 RX ADMIN — Medication SCH ML: at 20:25

## 2020-03-18 RX ADMIN — FAMOTIDINE SCH MG: 10 INJECTION, SOLUTION INTRAVENOUS at 10:01

## 2020-03-18 RX ADMIN — INSULIN LISPRO PRN UNIT: 100 INJECTION, SOLUTION INTRAVENOUS; SUBCUTANEOUS at 12:53

## 2020-03-18 RX ADMIN — INSULIN LISPRO PRN UNIT: 100 INJECTION, SOLUTION INTRAVENOUS; SUBCUTANEOUS at 20:31

## 2020-03-18 RX ADMIN — INSULIN LISPRO PRN UNIT: 100 INJECTION, SOLUTION INTRAVENOUS; SUBCUTANEOUS at 17:26

## 2020-03-18 RX ADMIN — VANCOMYCIN HYDROCHLORIDE SCH MG: KIT at 17:27

## 2020-03-18 RX ADMIN — Medication SCH ML: at 10:04

## 2020-03-19 LAB
ANION GAP SERPL CALC-SCNC: 11 MMOL/L (ref 10–20)
BUN SERPL-MCNC: 10 MG/DL (ref 9.8–20.1)
CALCIUM SERPL-MCNC: 8.1 MG/DL (ref 7.8–10.44)
CHLORIDE SERPL-SCNC: 105 MMOL/L (ref 98–107)
CO2 SERPL-SCNC: 25 MMOL/L (ref 23–31)
CREAT CL PREDICTED SERPL C-G-VRATE: 62 ML/MIN (ref 70–130)
GLUCOSE SERPL-MCNC: 148 MG/DL (ref 80–115)
HGB BLD-MCNC: 9.8 G/DL (ref 12–16)
MCH RBC QN AUTO: 30.8 PG (ref 27–31)
MCV RBC AUTO: 89.7 FL (ref 78–98)
MDIFF COMPLETE?: YES
PLATELET # BLD AUTO: 177 THOU/UL (ref 130–400)
POLYCHROMASIA BLD QL SMEAR: (no result) (100X)
POTASSIUM SERPL-SCNC: 3.5 MMOL/L (ref 3.5–5.1)
RBC # BLD AUTO: 3.17 MILL/UL (ref 4.2–5.4)
SODIUM SERPL-SCNC: 137 MMOL/L (ref 136–145)
VANCOMYCIN TROUGH SERPL-MCNC: 14.8 UG/ML
WBC # BLD AUTO: 10.5 THOU/UL (ref 4.8–10.8)

## 2020-03-19 PROCEDURE — 0JH60WZ INSERTION OF TOTALLY IMPLANTABLE VASCULAR ACCESS DEVICE INTO CHEST SUBCUTANEOUS TISSUE AND FASCIA, OPEN APPROACH: ICD-10-PCS | Performed by: SPECIALIST

## 2020-03-19 PROCEDURE — B518ZZA FLUOROSCOPY OF SUPERIOR VENA CAVA, GUIDANCE: ICD-10-PCS | Performed by: SPECIALIST

## 2020-03-19 PROCEDURE — 02HV33Z INSERTION OF INFUSION DEVICE INTO SUPERIOR VENA CAVA, PERCUTANEOUS APPROACH: ICD-10-PCS | Performed by: SPECIALIST

## 2020-03-19 RX ADMIN — LACTOBACILLUS ACIDOPH-L.BULGARICUS 1 MILLION CELL CHEWABLE TABLET SCH: at 09:00

## 2020-03-19 RX ADMIN — VANCOMYCIN HYDROCHLORIDE SCH MLS: 1 INJECTION, POWDER, LYOPHILIZED, FOR SOLUTION INTRAVENOUS at 23:20

## 2020-03-19 RX ADMIN — VANCOMYCIN HYDROCHLORIDE SCH MG: KIT at 17:36

## 2020-03-19 RX ADMIN — VANCOMYCIN HYDROCHLORIDE SCH MLS: 1 INJECTION, POWDER, LYOPHILIZED, FOR SOLUTION INTRAVENOUS at 00:28

## 2020-03-19 RX ADMIN — VANCOMYCIN HYDROCHLORIDE SCH MG: KIT at 23:19

## 2020-03-19 RX ADMIN — VANCOMYCIN HYDROCHLORIDE SCH MG: KIT at 05:18

## 2020-03-19 RX ADMIN — VANCOMYCIN HYDROCHLORIDE SCH MG: KIT at 11:21

## 2020-03-19 RX ADMIN — Medication SCH ML: at 08:08

## 2020-03-19 RX ADMIN — FAMOTIDINE SCH MG: 10 INJECTION, SOLUTION INTRAVENOUS at 08:07

## 2020-03-19 RX ADMIN — VANCOMYCIN HYDROCHLORIDE SCH MG: KIT at 00:28

## 2020-03-19 RX ADMIN — Medication SCH ML: at 20:28

## 2020-03-20 LAB
ANION GAP SERPL CALC-SCNC: 11 MMOL/L (ref 10–20)
BUN SERPL-MCNC: 11 MG/DL (ref 9.8–20.1)
CALCIUM SERPL-MCNC: 8.2 MG/DL (ref 7.8–10.44)
CHLORIDE SERPL-SCNC: 105 MMOL/L (ref 98–107)
CO2 SERPL-SCNC: 24 MMOL/L (ref 23–31)
CREAT CL PREDICTED SERPL C-G-VRATE: 61 ML/MIN (ref 70–130)
GLUCOSE SERPL-MCNC: 144 MG/DL (ref 80–115)
HGB BLD-MCNC: 10.1 G/DL (ref 12–16)
MCH RBC QN AUTO: 30.4 PG (ref 27–31)
MCV RBC AUTO: 89 FL (ref 78–98)
MDIFF COMPLETE?: YES
PLATELET # BLD AUTO: 187 THOU/UL (ref 130–400)
POLYCHROMASIA BLD QL SMEAR: (no result) (100X)
POTASSIUM SERPL-SCNC: 3.6 MMOL/L (ref 3.5–5.1)
RBC # BLD AUTO: 3.31 MILL/UL (ref 4.2–5.4)
SODIUM SERPL-SCNC: 136 MMOL/L (ref 136–145)
VANCOMYCIN TROUGH SERPL-MCNC: 16.4 UG/ML
WBC # BLD AUTO: 11.2 THOU/UL (ref 4.8–10.8)

## 2020-03-20 RX ADMIN — INSULIN LISPRO PRN UNIT: 100 INJECTION, SOLUTION INTRAVENOUS; SUBCUTANEOUS at 05:39

## 2020-03-20 RX ADMIN — INSULIN LISPRO PRN UNIT: 100 INJECTION, SOLUTION INTRAVENOUS; SUBCUTANEOUS at 17:57

## 2020-03-20 RX ADMIN — FAMOTIDINE SCH MG: 10 INJECTION, SOLUTION INTRAVENOUS at 08:54

## 2020-03-20 RX ADMIN — VANCOMYCIN HYDROCHLORIDE SCH MG: KIT at 05:39

## 2020-03-20 RX ADMIN — VANCOMYCIN HYDROCHLORIDE SCH MG: KIT at 17:57

## 2020-03-20 RX ADMIN — Medication SCH ML: at 21:29

## 2020-03-20 RX ADMIN — Medication SCH ML: at 10:11

## 2020-03-20 RX ADMIN — VANCOMYCIN HYDROCHLORIDE SCH MG: KIT at 12:27

## 2020-03-20 RX ADMIN — INSULIN LISPRO PRN UNIT: 100 INJECTION, SOLUTION INTRAVENOUS; SUBCUTANEOUS at 12:27

## 2020-03-20 RX ADMIN — LACTOBACILLUS ACIDOPH-L.BULGARICUS 1 MILLION CELL CHEWABLE TABLET SCH TAB: at 10:11

## 2020-03-21 LAB
ANION GAP SERPL CALC-SCNC: 10 MMOL/L (ref 10–20)
BUN SERPL-MCNC: 10 MG/DL (ref 9.8–20.1)
CALCIUM SERPL-MCNC: 7.8 MG/DL (ref 7.8–10.44)
CHLORIDE SERPL-SCNC: 105 MMOL/L (ref 98–107)
CO2 SERPL-SCNC: 26 MMOL/L (ref 23–31)
CREAT CL PREDICTED SERPL C-G-VRATE: 57 ML/MIN (ref 70–130)
GLUCOSE SERPL-MCNC: 287 MG/DL (ref 80–115)
HGB BLD-MCNC: 8.8 G/DL (ref 12–16)
MCH RBC QN AUTO: 29.7 PG (ref 27–31)
MCV RBC AUTO: 89.8 FL (ref 78–98)
MDIFF COMPLETE?: YES
PLATELET # BLD AUTO: 167 THOU/UL (ref 130–400)
POTASSIUM SERPL-SCNC: 3.6 MMOL/L (ref 3.5–5.1)
RBC # BLD AUTO: 2.96 MILL/UL (ref 4.2–5.4)
SODIUM SERPL-SCNC: 137 MMOL/L (ref 136–145)
WBC # BLD AUTO: 8.2 THOU/UL (ref 4.8–10.8)

## 2020-03-21 RX ADMIN — VANCOMYCIN HYDROCHLORIDE SCH MG: KIT at 00:22

## 2020-03-21 RX ADMIN — INSULIN LISPRO PRN UNIT: 100 INJECTION, SOLUTION INTRAVENOUS; SUBCUTANEOUS at 17:27

## 2020-03-21 RX ADMIN — INSULIN LISPRO PRN UNIT: 100 INJECTION, SOLUTION INTRAVENOUS; SUBCUTANEOUS at 04:29

## 2020-03-21 RX ADMIN — FAMOTIDINE SCH MG: 10 INJECTION, SOLUTION INTRAVENOUS at 09:42

## 2020-03-21 RX ADMIN — VANCOMYCIN HYDROCHLORIDE SCH MG: KIT at 12:34

## 2020-03-21 RX ADMIN — LACTOBACILLUS ACIDOPH-L.BULGARICUS 1 MILLION CELL CHEWABLE TABLET SCH TAB: at 09:43

## 2020-03-21 RX ADMIN — INSULIN LISPRO PRN UNIT: 100 INJECTION, SOLUTION INTRAVENOUS; SUBCUTANEOUS at 12:33

## 2020-03-21 RX ADMIN — VANCOMYCIN HYDROCHLORIDE SCH MG: KIT at 17:20

## 2020-03-21 RX ADMIN — Medication SCH ML: at 21:48

## 2020-03-21 RX ADMIN — Medication SCH ML: at 09:44

## 2020-03-21 RX ADMIN — INSULIN LISPRO PRN UNIT: 100 INJECTION, SOLUTION INTRAVENOUS; SUBCUTANEOUS at 21:55

## 2020-03-21 RX ADMIN — VANCOMYCIN HYDROCHLORIDE SCH MLS: 1 INJECTION, POWDER, LYOPHILIZED, FOR SOLUTION INTRAVENOUS at 00:22

## 2020-03-21 RX ADMIN — VANCOMYCIN HYDROCHLORIDE SCH MG: KIT at 09:42

## 2020-03-22 LAB
ANION GAP SERPL CALC-SCNC: 10 MMOL/L (ref 10–20)
BUN SERPL-MCNC: 9 MG/DL (ref 9.8–20.1)
CALCIUM SERPL-MCNC: 7.8 MG/DL (ref 7.8–10.44)
CHLORIDE SERPL-SCNC: 104 MMOL/L (ref 98–107)
CO2 SERPL-SCNC: 24 MMOL/L (ref 23–31)
CREAT CL PREDICTED SERPL C-G-VRATE: 60 ML/MIN (ref 70–130)
GLUCOSE SERPL-MCNC: 272 MG/DL (ref 80–115)
HGB BLD-MCNC: 9.3 G/DL (ref 12–16)
MCH RBC QN AUTO: 30.8 PG (ref 27–31)
MCV RBC AUTO: 88.9 FL (ref 78–98)
MDIFF COMPLETE?: YES
PLATELET # BLD AUTO: 181 THOU/UL (ref 130–400)
POTASSIUM SERPL-SCNC: 3.7 MMOL/L (ref 3.5–5.1)
RBC # BLD AUTO: 3.03 MILL/UL (ref 4.2–5.4)
SODIUM SERPL-SCNC: 134 MMOL/L (ref 136–145)
VANCOMYCIN TROUGH SERPL-MCNC: 17.2 UG/ML
WBC # BLD AUTO: 8.6 THOU/UL (ref 4.8–10.8)

## 2020-03-22 RX ADMIN — INSULIN LISPRO PRN UNIT: 100 INJECTION, SOLUTION INTRAVENOUS; SUBCUTANEOUS at 17:32

## 2020-03-22 RX ADMIN — INSULIN LISPRO PRN UNIT: 100 INJECTION, SOLUTION INTRAVENOUS; SUBCUTANEOUS at 22:32

## 2020-03-22 RX ADMIN — LACTOBACILLUS ACIDOPH-L.BULGARICUS 1 MILLION CELL CHEWABLE TABLET SCH TAB: at 09:21

## 2020-03-22 RX ADMIN — VANCOMYCIN HYDROCHLORIDE SCH MG: KIT at 00:34

## 2020-03-22 RX ADMIN — VANCOMYCIN HYDROCHLORIDE SCH MLS: 1 INJECTION, POWDER, LYOPHILIZED, FOR SOLUTION INTRAVENOUS at 00:33

## 2020-03-22 RX ADMIN — VANCOMYCIN HYDROCHLORIDE SCH MG: KIT at 12:38

## 2020-03-22 RX ADMIN — Medication SCH ML: at 22:29

## 2020-03-22 RX ADMIN — VANCOMYCIN HYDROCHLORIDE SCH MG: KIT at 23:05

## 2020-03-22 RX ADMIN — VANCOMYCIN HYDROCHLORIDE SCH MG: KIT at 05:41

## 2020-03-22 RX ADMIN — FAMOTIDINE SCH MG: 10 INJECTION, SOLUTION INTRAVENOUS at 09:21

## 2020-03-22 RX ADMIN — INSULIN LISPRO PRN UNIT: 100 INJECTION, SOLUTION INTRAVENOUS; SUBCUTANEOUS at 05:39

## 2020-03-22 RX ADMIN — INSULIN LISPRO PRN UNIT: 100 INJECTION, SOLUTION INTRAVENOUS; SUBCUTANEOUS at 12:38

## 2020-03-22 RX ADMIN — Medication SCH ML: at 09:21

## 2020-03-22 RX ADMIN — VANCOMYCIN HYDROCHLORIDE SCH MG: KIT at 17:32

## 2020-03-23 LAB
ANION GAP SERPL CALC-SCNC: 10 MMOL/L (ref 10–20)
BUN SERPL-MCNC: 11 MG/DL (ref 9.8–20.1)
CALCIUM SERPL-MCNC: 7.9 MG/DL (ref 7.8–10.44)
CHLORIDE SERPL-SCNC: 104 MMOL/L (ref 98–107)
CO2 SERPL-SCNC: 25 MMOL/L (ref 23–31)
CREAT CL PREDICTED SERPL C-G-VRATE: 61 ML/MIN (ref 70–130)
GLUCOSE SERPL-MCNC: 178 MG/DL (ref 80–115)
HGB BLD-MCNC: 9.3 G/DL (ref 12–16)
MCH RBC QN AUTO: 30.1 PG (ref 27–31)
MCV RBC AUTO: 88.4 FL (ref 78–98)
MDIFF COMPLETE?: YES
PLATELET # BLD AUTO: 170 THOU/UL (ref 130–400)
POTASSIUM SERPL-SCNC: 3.8 MMOL/L (ref 3.5–5.1)
RBC # BLD AUTO: 3.08 MILL/UL (ref 4.2–5.4)
SODIUM SERPL-SCNC: 135 MMOL/L (ref 136–145)
WBC # BLD AUTO: 9.4 THOU/UL (ref 4.8–10.8)

## 2020-03-23 RX ADMIN — Medication SCH ML: at 21:14

## 2020-03-23 RX ADMIN — VANCOMYCIN HYDROCHLORIDE SCH MG: KIT at 11:41

## 2020-03-23 RX ADMIN — VANCOMYCIN HYDROCHLORIDE SCH MLS: 1 INJECTION, POWDER, LYOPHILIZED, FOR SOLUTION INTRAVENOUS at 00:40

## 2020-03-23 RX ADMIN — INSULIN LISPRO PRN UNIT: 100 INJECTION, SOLUTION INTRAVENOUS; SUBCUTANEOUS at 18:08

## 2020-03-23 RX ADMIN — FAMOTIDINE SCH MG: 10 INJECTION, SOLUTION INTRAVENOUS at 08:17

## 2020-03-23 RX ADMIN — INSULIN LISPRO PRN UNIT: 100 INJECTION, SOLUTION INTRAVENOUS; SUBCUTANEOUS at 11:50

## 2020-03-23 RX ADMIN — Medication SCH ML: at 08:17

## 2020-03-23 RX ADMIN — VANCOMYCIN HYDROCHLORIDE SCH MG: KIT at 18:02

## 2020-03-23 RX ADMIN — LACTOBACILLUS ACIDOPH-L.BULGARICUS 1 MILLION CELL CHEWABLE TABLET SCH TAB: at 08:17

## 2020-03-23 RX ADMIN — VANCOMYCIN HYDROCHLORIDE SCH MG: KIT at 06:36

## 2020-03-24 VITALS — DIASTOLIC BLOOD PRESSURE: 88 MMHG | SYSTOLIC BLOOD PRESSURE: 124 MMHG | TEMPERATURE: 98.9 F

## 2020-03-24 LAB
ANION GAP SERPL CALC-SCNC: 11 MMOL/L (ref 10–20)
BUN SERPL-MCNC: 10 MG/DL (ref 9.8–20.1)
CALCIUM SERPL-MCNC: 8.1 MG/DL (ref 7.8–10.44)
CHLORIDE SERPL-SCNC: 104 MMOL/L (ref 98–107)
CO2 SERPL-SCNC: 25 MMOL/L (ref 23–31)
CREAT CL PREDICTED SERPL C-G-VRATE: 55 ML/MIN (ref 70–130)
GLUCOSE SERPL-MCNC: 222 MG/DL (ref 80–115)
HGB BLD-MCNC: 9.5 G/DL (ref 12–16)
MCH RBC QN AUTO: 30.7 PG (ref 27–31)
MCV RBC AUTO: 88.8 FL (ref 78–98)
MDIFF COMPLETE?: YES
PLATELET # BLD AUTO: 164 THOU/UL (ref 130–400)
POLYCHROMASIA BLD QL SMEAR: (no result) (100X)
POTASSIUM SERPL-SCNC: 3.9 MMOL/L (ref 3.5–5.1)
RBC # BLD AUTO: 3.1 MILL/UL (ref 4.2–5.4)
SODIUM SERPL-SCNC: 136 MMOL/L (ref 136–145)
WBC # BLD AUTO: 8.3 THOU/UL (ref 4.8–10.8)

## 2020-03-24 PROCEDURE — B548ZZA ULTRASONOGRAPHY OF SUPERIOR VENA CAVA, GUIDANCE: ICD-10-PCS | Performed by: RADIOLOGY

## 2020-03-24 PROCEDURE — 02HV33Z INSERTION OF INFUSION DEVICE INTO SUPERIOR VENA CAVA, PERCUTANEOUS APPROACH: ICD-10-PCS | Performed by: RADIOLOGY

## 2020-03-24 RX ADMIN — LACTOBACILLUS ACIDOPH-L.BULGARICUS 1 MILLION CELL CHEWABLE TABLET SCH TAB: at 08:06

## 2020-03-24 RX ADMIN — VANCOMYCIN HYDROCHLORIDE SCH MG: KIT at 06:27

## 2020-03-24 RX ADMIN — VANCOMYCIN HYDROCHLORIDE SCH MG: KIT at 01:53

## 2020-03-24 RX ADMIN — INSULIN LISPRO PRN UNIT: 100 INJECTION, SOLUTION INTRAVENOUS; SUBCUTANEOUS at 06:32

## 2020-03-24 RX ADMIN — FAMOTIDINE SCH MG: 10 INJECTION, SOLUTION INTRAVENOUS at 08:06

## 2020-03-24 RX ADMIN — Medication SCH ML: at 08:07

## 2020-03-24 RX ADMIN — VANCOMYCIN HYDROCHLORIDE SCH MG: KIT at 11:51

## 2020-03-24 RX ADMIN — VANCOMYCIN HYDROCHLORIDE SCH MLS: 1 INJECTION, POWDER, LYOPHILIZED, FOR SOLUTION INTRAVENOUS at 01:53

## 2020-05-20 ENCOUNTER — HOSPITAL ENCOUNTER (INPATIENT)
Dept: HOSPITAL 92 - ERS | Age: 65
LOS: 7 days | Discharge: HOME | DRG: 660 | End: 2020-05-27
Attending: INTERNAL MEDICINE | Admitting: INTERNAL MEDICINE
Payer: MEDICARE

## 2020-05-20 VITALS — BODY MASS INDEX: 24.7 KG/M2
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
## 2020-05-20 DIAGNOSIS — R31.9: ICD-10-CM

## 2020-05-20 DIAGNOSIS — F32.9: ICD-10-CM

## 2020-05-20 DIAGNOSIS — E78.5: ICD-10-CM

## 2020-05-20 DIAGNOSIS — Z90.49: ICD-10-CM

## 2020-05-20 DIAGNOSIS — Z88.0: ICD-10-CM

## 2020-05-20 DIAGNOSIS — N13.9: ICD-10-CM

## 2020-05-20 DIAGNOSIS — K21.9: ICD-10-CM

## 2020-05-20 DIAGNOSIS — E11.649: ICD-10-CM

## 2020-05-20 DIAGNOSIS — Z79.4: ICD-10-CM

## 2020-05-20 DIAGNOSIS — Z79.899: ICD-10-CM

## 2020-05-20 DIAGNOSIS — T83.593A: Primary | ICD-10-CM

## 2020-05-20 DIAGNOSIS — E78.00: ICD-10-CM

## 2020-05-20 DIAGNOSIS — I95.9: ICD-10-CM

## 2020-05-20 DIAGNOSIS — Z98.51: ICD-10-CM

## 2020-05-20 DIAGNOSIS — M54.9: ICD-10-CM

## 2020-05-20 DIAGNOSIS — G89.29: ICD-10-CM

## 2020-05-20 DIAGNOSIS — A04.72: ICD-10-CM

## 2020-05-20 DIAGNOSIS — M79.7: ICD-10-CM

## 2020-05-20 DIAGNOSIS — G43.909: ICD-10-CM

## 2020-05-20 DIAGNOSIS — Z88.8: ICD-10-CM

## 2020-05-20 DIAGNOSIS — B37.41: ICD-10-CM

## 2020-05-20 DIAGNOSIS — M25.519: ICD-10-CM

## 2020-05-20 DIAGNOSIS — F41.9: ICD-10-CM

## 2020-05-20 DIAGNOSIS — N17.9: ICD-10-CM

## 2020-05-20 DIAGNOSIS — Z88.1: ICD-10-CM

## 2020-05-20 DIAGNOSIS — C91.10: ICD-10-CM

## 2020-05-20 DIAGNOSIS — Z90.710: ICD-10-CM

## 2020-05-20 DIAGNOSIS — Z79.01: ICD-10-CM

## 2020-05-20 LAB
ALBUMIN SERPL BCG-MCNC: 3.6 G/DL (ref 3.4–4.8)
ALP SERPL-CCNC: 210 U/L (ref 40–110)
ALT SERPL W P-5'-P-CCNC: 12 U/L (ref 8–55)
ANION GAP SERPL CALC-SCNC: 11 MMOL/L (ref 10–20)
AST SERPL-CCNC: 24 U/L (ref 5–34)
BILIRUB SERPL-MCNC: 1.2 MG/DL (ref 0.2–1.2)
BUN SERPL-MCNC: 23 MG/DL (ref 9.8–20.1)
CALCIUM SERPL-MCNC: 8.7 MG/DL (ref 7.8–10.44)
CHLORIDE SERPL-SCNC: 105 MMOL/L (ref 98–107)
CO2 SERPL-SCNC: 25 MMOL/L (ref 23–31)
CREAT CL PREDICTED SERPL C-G-VRATE: 0 ML/MIN (ref 70–130)
GLOBULIN SER CALC-MCNC: 2.5 G/DL (ref 2.4–3.5)
GLUCOSE SERPL-MCNC: 104 MG/DL (ref 80–115)
HGB BLD-MCNC: 10.9 G/DL (ref 12–16)
LEUKOCYTE ESTERASE UR QL STRIP.AUTO: 500 LEU/UL
MCH RBC QN AUTO: 29.7 PG (ref 27–31)
MCV RBC AUTO: 89.7 FL (ref 78–98)
MDIFF COMPLETE?: YES
OVALOCYTES BLD QL SMEAR: (no result) (100X)
PLATELET # BLD AUTO: 145 THOU/UL (ref 130–400)
POLYCHROMASIA BLD QL SMEAR: (no result) (100X)
POTASSIUM SERPL-SCNC: 4.1 MMOL/L (ref 3.5–5.1)
PROT UR STRIP.AUTO-MCNC: 200 MG/DL
RBC # BLD AUTO: 3.69 MILL/UL (ref 4.2–5.4)
SCHISTOCYTES BLD QL SMEAR: (no result) (100X)
SODIUM SERPL-SCNC: 137 MMOL/L (ref 136–145)
WBC # BLD AUTO: 3.9 THOU/UL (ref 4.8–10.8)
YEAST # UR AUTO: (no result) HPF
YEAST # UR AUTO: (no result) HPF

## 2020-05-20 PROCEDURE — 80048 BASIC METABOLIC PNL TOTAL CA: CPT

## 2020-05-20 PROCEDURE — 71045 X-RAY EXAM CHEST 1 VIEW: CPT

## 2020-05-20 PROCEDURE — 87329 GIARDIA AG IA: CPT

## 2020-05-20 PROCEDURE — 85025 COMPLETE CBC W/AUTO DIFF WBC: CPT

## 2020-05-20 PROCEDURE — 83630 LACTOFERRIN FECAL (QUAL): CPT

## 2020-05-20 PROCEDURE — 85018 HEMOGLOBIN: CPT

## 2020-05-20 PROCEDURE — 81015 MICROSCOPIC EXAM OF URINE: CPT

## 2020-05-20 PROCEDURE — 87045 FECES CULTURE AEROBIC BACT: CPT

## 2020-05-20 PROCEDURE — 87328 CRYPTOSPORIDIUM AG IA: CPT

## 2020-05-20 PROCEDURE — 36416 COLLJ CAPILLARY BLOOD SPEC: CPT

## 2020-05-20 PROCEDURE — 80053 COMPREHEN METABOLIC PANEL: CPT

## 2020-05-20 PROCEDURE — 87324 CLOSTRIDIUM AG IA: CPT

## 2020-05-20 PROCEDURE — C1769 GUIDE WIRE: HCPCS

## 2020-05-20 PROCEDURE — 85014 HEMATOCRIT: CPT

## 2020-05-20 PROCEDURE — 36415 COLL VENOUS BLD VENIPUNCTURE: CPT

## 2020-05-20 PROCEDURE — 74420 UROGRAPHY RTRGR +-KUB: CPT

## 2020-05-20 PROCEDURE — 81003 URINALYSIS AUTO W/O SCOPE: CPT

## 2020-05-20 PROCEDURE — 87427 SHIGA-LIKE TOXIN AG IA: CPT

## 2020-05-20 PROCEDURE — 85049 AUTOMATED PLATELET COUNT: CPT

## 2020-05-20 PROCEDURE — 87046 STOOL CULTR AEROBIC BACT EA: CPT

## 2020-05-20 PROCEDURE — 87449 NOS EACH ORGANISM AG IA: CPT

## 2020-05-20 PROCEDURE — 87493 C DIFF AMPLIFIED PROBE: CPT

## 2020-05-20 PROCEDURE — 87040 BLOOD CULTURE FOR BACTERIA: CPT

## 2020-05-20 PROCEDURE — 93005 ELECTROCARDIOGRAM TRACING: CPT

## 2020-05-20 PROCEDURE — 87086 URINE CULTURE/COLONY COUNT: CPT

## 2020-05-20 PROCEDURE — 83605 ASSAY OF LACTIC ACID: CPT

## 2020-05-20 PROCEDURE — 74176 CT ABD & PELVIS W/O CONTRAST: CPT

## 2020-05-20 PROCEDURE — C1758 CATHETER, URETERAL: HCPCS

## 2020-05-20 RX ADMIN — INSULIN GLARGINE SCH MLS: 100 INJECTION, SOLUTION SUBCUTANEOUS at 20:39

## 2020-05-20 NOTE — CT
[facility]  Protocol

5/20/2020 2:17 PM



HISTORY:

Dysuria and yeast in urine for several weeks.



COMPARISON:

3/12/2020



Technique: 

Multiple contiguous axial CT images are obtained through the abdomen and pelvis without IV contrast. 
Coronal reformats are provided.



FINDINGS:

This examination is limited for the evaluation of solid organs and vascular structures due to the lac
k of intravenous contrast.



Lower Chest: Vascular calcifications in the coronary arteries and involving the thoracic aorta lung b
ases are clear.



Abdomen:

Liver: Grossly normal non-enhanced CT appearance.

Gallbladder: Surgically absent.

Pancreas: Grossly normal nonenhanced CT appearance.

Spleen: Upper limits of normal in size in AP dimension.

Adrenals: Grossly normal nonenhanced CT appearance.

Kidneys/ureters: Bilateral ureteral stents remain in place with mild persistent bilateral hydronephro
sis slightly greater on the left. Bilateral perinephric stranding is again seen



Pelvis:

Urinary bladder: Incompletely distended. Ureteral stents are located within the urinary bladder.

Reproductive Organs: Evidence of hysterectomy.



Bowel: Normal caliber.

Appendix: Not definitely visualized. 



Peritoneum: Previously noted lymphadenopathy in the region of the central mesentery has improved, the
re is persistent mild inflammatory stranding and haziness of the mesentery in this region. There

are mildly prominent lymph nodes in the region gastrohepatic ligament, these lymph nodes have diminis
hed in size compared to prior study. The enlarged lymph nodes seen in the central mesentery of the

abdomen have also decreased in size.



Retroperitoneum: Previously seen retroperitoneal lymphadenopathy does appear improved compared to the
 prior exam. A precaval lymph node in region of the radha hepatis previously measured 5.6 cm in

greatest dimension, and on today's exam measures 4.4 cm in greatest dimension. Largest left para-aort
ic lymph node previously measured 3 cm, and this lymph node on today's examination measures 2 cm.

Additional aortocaval lymph nodes much smaller in size. However, there is inflammatory stranding seen
 in an aortocaval retroperitoneal location. There is also stranding and soft tissue attenuation

seen in the left hemipelvis with presacral inflammatory changes and edema which is overall stable com
pared to the prior exam.



Vessels: Dense vascular calcifications are seen in the abdominal aorta and involving the iliac arteri
es as well as splenic artery..



Abdominal Wall: Prominent bilateral inguinal lymph nodes are again seen.

Bones: Postoperative changes lumbosacral junction are again seen with degenerative changes in the lum
bar spine. No suspicious lytic or sclerotic osseous lesions are identified.  



IMPRESSION:

1. Bilateral ureteral stents in place with mild bilateral hydronephrosis slightly greater on the left
. This is overall similar to the prior exam. Persistent mild symmetric bilateral perinephric

stranding is seen. Kidneys are unable to be further evaluated due to lack of intravenous contrast.

2. Spleen remains at the upper limits of normal in size.

3. Improvement in aortocaval and mesenteric lymphadenopathy. Mildly prominent bilateral inguinal lymp
h nodes are again seen. Iliac chain lymph nodes are also again seen which are predominantly fatty

replaced on today's exam.

4. Inflammatory stranding in the aortocaval/retroperitoneal location with greater inflammatory change
s and soft tissue appearing density again seen in the left hemipelvis not significantly changed. 



Reported By: Tyler Sands 

Electronically Signed:  5/20/2020 3:03 PM

## 2020-05-21 LAB
ANION GAP SERPL CALC-SCNC: 10 MMOL/L (ref 10–20)
BUN SERPL-MCNC: 19 MG/DL (ref 9.8–20.1)
CALCIUM SERPL-MCNC: 8.2 MG/DL (ref 7.8–10.44)
CHLORIDE SERPL-SCNC: 109 MMOL/L (ref 98–107)
CO2 SERPL-SCNC: 26 MMOL/L (ref 23–31)
CREAT CL PREDICTED SERPL C-G-VRATE: 52 ML/MIN (ref 70–130)
GLUCOSE SERPL-MCNC: 113 MG/DL (ref 80–115)
HGB BLD-MCNC: 9.9 G/DL (ref 12–16)
MCH RBC QN AUTO: 29.2 PG (ref 27–31)
MCV RBC AUTO: 90.6 FL (ref 78–98)
MDIFF COMPLETE?: YES
PLATELET # BLD AUTO: 121 THOU/UL (ref 130–400)
POTASSIUM SERPL-SCNC: 4.2 MMOL/L (ref 3.5–5.1)
RBC # BLD AUTO: 3.38 MILL/UL (ref 4.2–5.4)
SODIUM SERPL-SCNC: 141 MMOL/L (ref 136–145)
WBC # BLD AUTO: 2.8 THOU/UL (ref 4.8–10.8)

## 2020-05-21 RX ADMIN — VANCOMYCIN HYDROCHLORIDE SCH MG: KIT at 23:42

## 2020-05-21 RX ADMIN — MEROPENEM AND SODIUM CHLORIDE SCH MLS: 1 INJECTION, SOLUTION INTRAVENOUS at 03:48

## 2020-05-21 RX ADMIN — VANCOMYCIN HYDROCHLORIDE SCH MG: KIT at 12:27

## 2020-05-21 RX ADMIN — INSULIN HUMAN SCH: 100 INJECTION, SUSPENSION SUBCUTANEOUS at 12:13

## 2020-05-21 RX ADMIN — ALUMINUM ZIRCONIUM TRICHLOROHYDREX GLY SCH EACH: 0.2 STICK TOPICAL at 13:55

## 2020-05-21 RX ADMIN — MEROPENEM AND SODIUM CHLORIDE SCH MLS: 1 INJECTION, SOLUTION INTRAVENOUS at 12:27

## 2020-05-21 RX ADMIN — VANCOMYCIN HYDROCHLORIDE SCH MG: KIT at 00:41

## 2020-05-21 RX ADMIN — Medication SCH: at 21:01

## 2020-05-21 RX ADMIN — VANCOMYCIN HYDROCHLORIDE SCH MG: KIT at 17:33

## 2020-05-21 RX ADMIN — INSULIN HUMAN SCH: 100 INJECTION, SUSPENSION SUBCUTANEOUS at 17:10

## 2020-05-21 RX ADMIN — VANCOMYCIN HYDROCHLORIDE SCH MG: KIT at 05:10

## 2020-05-21 RX ADMIN — INSULIN HUMAN SCH UNIT: 100 INJECTION, SUSPENSION SUBCUTANEOUS at 11:40

## 2020-05-21 RX ADMIN — MEROPENEM AND SODIUM CHLORIDE SCH MLS: 1 INJECTION, SOLUTION INTRAVENOUS at 20:59

## 2020-05-21 RX ADMIN — INSULIN GLARGINE SCH: 100 INJECTION, SOLUTION SUBCUTANEOUS at 21:01

## 2020-05-21 NOTE — HP
CHIEF COMPLAINT:  Transferred for direct admit from Dr. Guzman' office for bladder

infection. 



HISTORY OF PRESENT ILLNESS:  The patient is a 65-year-old female, with past medical

history of CLL; coronary artery disease; fibromyalgia; atrial fibrillation, on

Eliquis; and diabetes mellitus, who was recently discharged from the hospital after

a prolonged hospitalization for Enterococcus bacteremia and UTI.  She had bilateral

ureteral stent placement in February.  The patient completed an outpatient course of

vancomycin and subsequently was symptom-free for a few days according to the

patient.  Afterwards, her symptoms started to return and she visited Dr. Guzman for

followup, where her urine culture revealed evidence of fungal infection.  She was

sent to the hospital for initiation of IV antibiotics after failing an outpatient

regimen. 



REVIEW OF SYSTEMS:  Negative, except as noted in the HPI.



PAST MEDICAL HISTORY:  As noted above.



PAST SURGICAL HISTORY:  Ear surgery, bladder lift, appendectomy, tubal ligation,

hysterectomy, and tonsillectomy. 



SOCIAL HISTORY:  The patient denies alcohol use, illicit drug use, or smoking.



FAMILY HISTORY:  Noncontributory.



ALLERGIES:  PATIENT IS ALLERGIC TO ASPIRIN, CEPHALOSPORINS, PENICILLINS, AND

QUINOLONES. 



PHYSICAL EXAMINATION:

GENERAL:  Patient is alert and oriented x3. 

HEENT:  Head is normocephalic and atraumatic.  Extraocular muscles are intact. 

NECK:  Supple. 

CHEST:  Clear to auscultation bilaterally. CARDIOVASCULAR EXAMINATION:  Showing

normal S1, S2, regular rate and rhythm.  No murmurs, rubs, or gallops. 

ABDOMEN:  Soft, nontender, and nondistended. 

EXTREMITIES:  Show no edema or rashes.



ASSESSMENT:  

1. Fungal urinary tract infection complicated by immunosuppression and ureteral

stents with some evidence of ureteral obstruction. 

2. Chronic lymphocytic leukemia.

3. Coronary artery disease.

4. Atrial fibrillation.



PLAN:  Discussed with Dr. Guzman.  We will admit the patient to the hospital and

initiate voriconazole 400 mg twice daily loading dose and then 200 mg twice daily.

We will also initiate meropenem to cover for bacterial infection.  The patient did

develop Clostridium difficile on her last hospitalization and Dr. Guzman recommended

treating her with oral vancomycin prophylactically. 







Job ID:  [id number]

## 2020-05-21 NOTE — EKG
Test Reason : 

Blood Pressure : ***/*** mmHG

Vent. Rate : 083 BPM     Atrial Rate : 083 BPM

   P-R Int : 118 ms          QRS Dur : 066 ms

    QT Int : 374 ms       P-R-T Axes : 011 -17 018 degrees

   QTc Int : 439 ms

 

Normal sinus rhythm

Normal ECG

 

 

Confirmed by CRISTA VILLAREAL DO ([id number]),  JUAN CARLOS MONTES ([id number]) on 5/21/2020 3:27:20 PM

 

Referred By:             Confirmed By:CRISTA VILLAREAL DO

## 2020-05-21 NOTE — PDOC.HOSPP
- Subjective


Encounter Date: 05/21/20


Subjective: 


The patient complains of lower abdominal pain and dizziness when ambulating.





- Objective


Vital Signs & Weight: 


 Vital Signs (12 hours)











  Temp Pulse Resp BP Pulse Ox


 


 05/21/20 07:15  98.2 F  79  16  101/62  96


 


 05/21/20 03:51  97.7 F  82  18  113/66  98


 


 05/21/20 00:00  98 F  82  18  134/62  97








 Weight











Weight                         144 lb 8 oz














Result Diagrams: 


 05/21/20 06:22





 05/21/20 06:22


Additional Labs: 


 Accuchecks











  05/21/20 05/20/20





  05:01 20:41


 


POC Glucose  130 H  125 H














Hospitalist ROS





- Medication


Medications: 


Active Medications











Generic Name Dose Route Start Last Admin





  Trade Name Freq  PRN Reason Stop Dose Admin


 


Acetaminophen  650 mg  05/20/20 18:20  05/21/20 09:09





  Tylenol  PO   650 mg





  Q4H PRN   Administration





  Headache/Fever/Mild Pain (1-3)   





     





     





     


 


Apixaban  5 mg  05/20/20 21:00  05/21/20 09:05





  Eliquis  PO   5 mg





  BID MARY   Administration





     





     





     





     


 


Ezetimibe  10 mg  05/20/20 21:00  05/20/20 20:32





  Zetia  PO   10 mg





  HS MARY   Administration





     





     





     





     


 


Gabapentin  600 mg  05/20/20 21:00  05/21/20 09:05





  Neurontin  PO   600 mg





  TID MARY   Administration





     





     





     





     


 


Sodium Chloride  1,000 mls @ 75 mls/hr  05/20/20 19:15  05/21/20 09:05





  Normal Saline 0.9%  IV   1,000 mls





  .[id number] MARY   Administration





     





     





     





     


 


Meropenem 1 gm/ Device  50 mls @ 100 mls/hr  05/21/20 04:00  05/21/20 03:48





  IVPB   50 mls





  0400,1200,2000 MARY   Administration





     





     





     





     


 


Insulin Glargine 70 units/  0.7 mls @ 0 mls/hr  05/20/20 21:00  05/20/20 20:39





  Miscellaneous Medication  SC   0.7 mls





  HS MARY   Administration





     





     





     





     


 


Vancomycin HCl  125 mg  05/20/20 23:59  05/21/20 05:10





  First Vancomycin  PO   125 mg





  Q6HR MARY   Administration





     





     





     





     














- Exam


General Appearance: awake alert


Neck: supple


Respiratory: normal chest expansion, no tachypnea


Extremities: no cyanosis, no clubbing, no edema


Neurological: cranial nerve grossly intact, no weakness





Hosp A/P





- Plan





1.  Complicated fungal UTI.


2.  Immunosuppressed status due to CLL.


3.  Lower abdominal pain due to cystitis.


4.  Dizziness with low blood pressure.


5.  Diabetes mellitus on insulin.





Continue IV fluids.


The patient is on voriconazole, meropenem, and oral vancomycin per ID 

recommendations.


Urine cultures are pending.

## 2020-05-21 NOTE — CON
DATE OF CONSULTATION:  05/21/2020



REASON:  Refractory cystitis with multiple drug allergies.



HISTORY OF PRESENT ILLNESS:  A 65-year-old, whom I had seen a few times in the 
past

few years.  The last time was in March 2020 when she presented with a history of

CLL, initially managed conservatively, but more recently with an anti-CD20

monoclonal antibody.  She presented with bacteremia and obstructive uropathy.  
She

had bilateral stents and she had extensive abdominal and pelvic lymphadenopathy 
and

having developed 

E faecalis urosepsis.  The organism had the usual susceptibility profile and 
she was

discharged on IV vancomycin because of allergy history to beta-lactams.  She

received it for about 10 days and she also had to take oral vancomycin because 
of

Clostridium difficile colitis.  Since discharge, she has had recurrence of the

dysuria.  We repeated the urine culture and yeast were identified greater than

100,000 colony-forming units per mL and we tried Diflucan, but she reported

urticaria-type lesions and we had to discontinue it and we were thinking of 
giving

her voriconazole, but she became quite symptomatic with gross hematuria, so we

decided to admit her.  She had some low-grade temperature elevation at home.  No

back pain.  No headaches.  No shortness of breath.  A little bit of cough, but 
no

sputum production.  No joint symptoms.  No vomiting. 



PAST MEDICAL HISTORY:  Type 2 diabetes; fibromyalgia; CLL with progression and 
then

treatment with anti-CD20 monoclonal antibody; hypertension; cervical cancer, in

remission after hysterectomy; and anxiety and depression. 



HISTORY SURGICAL HISTORY:  Lumpectomy, ear surgery, bladder lift, and

appendectomy. 



SOCIAL HISTORY:  Former smoker.  She used to work as a cook.  Disabled.



FAMILY HISTORY:  Noncontributory.



ALLERGIES:  CEPHALEXIN, PENICILLIN, ASPIRIN, AND NOW DIFLUCAN.



CURRENT MEDICATIONS:  

1. Tylenol.

2. Eliquis.

4. Neurontin.

5. Insulin.

6. Meropenem.

7. Venclexta.

8. Tizanidine.

9. Voriconazole.



FAMILY HISTORY:  Noncontributory.



PHYSICAL EXAMINATION:

VITAL SIGNS:  T-max 99, now it is 98.1; blood pressure 114/70; pulse 84;

respirations 18; and O2 saturation 97. 

SKIN:  Unremarkable. 

Patient has a peripheral IV access.  No Patterson catheter. 

HEENT:  Ocular movements conjugate.  Oral cavity normal.  She has no native 
teeth

remaining in place. 

NECK:  Supple.  No jugular vein distention. LUNGS:  Symmetric, clear breath 
sounds. 

HEART:  S1 and S2, regular rate.  No S3 or S4. 

ABDOMEN:  Diffusely tender, but it is mildly tender.  There is no guarding.  No

rebound tenderness.  No bladder distention.  There is a more tenderness in the

suprapubic region and elsewhere noted. 

EXTREMITIES:  No joint inflammatory activity.  No edema.  Moves all extremities

equally.  Pulses 1+ in dorsalis pedis.  Cap refill normal.  Plantar responses 
are

flexor.  No clonus. 

NEUROLOGIC:  Cognitive function appears to be intact.  She is oriented.  Follows

commands.  Recollection is good. 



LABORATORY DATA:  White cell count is 3.9 and 2.8, hemoglobin 9.9, platelets 121
,

neutrophil percentage 26, 13% bands, total neutrophil count of above 900, and

monocyte 16.  Chemistry with a creatinine 1.35 and now 1.08.  Liver profile 
normal.

Alkaline phosphatase 210 with albumin of 3.6.  Urinalysis with greater than 50

rbc's, greater than 50 wbc's, and yeast 1+. 



IMAGING STUDY:  Include an abdomen and pelvis CT done without contrast.  The 
liver

was grossly normal.  Gallbladder absent.  Grossly normal pancreas, upper limits 
of

spleen.  Bilateral ureteral stents in place with mild persistent bilateral

hydronephrosis, slightly greater on the left side.  Bilateral perinephric 
stranding

seen.  Urinary bladder incompletely distended.  Lymphadenopathy in the central

mesentery has improved.  There is persistent mild inflammatory stranding in the

mesentery and mild mildly prominent lymph nodes in the region of the 
gastrohepatic

ligament.  These lymph nodes had diminished in size compared with prior study.

Retroperitoneal lymphadenopathy does appear improved compared to the prior 
exam. 



ASSESSMENT AND DISCUSSION:  

1. Type 2 diabetes.

2. Chronic lymphocytic leukemia with chemotherapy with anti-CD20 monoclonal 
antibody

with improvement in lymphadenopathy in the abdominal area.  Stents for 
management of

obstructive uropathy with sepsis due to E faecalis in March, which has been 
treated.

 Now, she has likely Candida species and could not tolerate Diflucan and is

currently on voriconazole. 



DISCUSSION:  Patient has a new set of urine sample for evaluation and we will 
see

what grows out of it.  We will continue meropenem and voriconazole for the time

being.  Hopefully, the stents will be able to be removed soon.  We will have Dr. Bahena take a look at her. 







Job ID:  [id number]



[facility]

## 2020-05-22 LAB
ANION GAP SERPL CALC-SCNC: 10 MMOL/L (ref 10–20)
ANION GAP SERPL CALC-SCNC: 11 MMOL/L (ref 10–20)
BUN SERPL-MCNC: 17 MG/DL (ref 9.8–20.1)
BUN SERPL-MCNC: 19 MG/DL (ref 9.8–20.1)
CALCIUM SERPL-MCNC: 7.5 MG/DL (ref 7.8–10.44)
CALCIUM SERPL-MCNC: 7.6 MG/DL (ref 7.8–10.44)
CHLORIDE SERPL-SCNC: 111 MMOL/L (ref 98–107)
CHLORIDE SERPL-SCNC: 111 MMOL/L (ref 98–107)
CO2 SERPL-SCNC: 20 MMOL/L (ref 23–31)
CO2 SERPL-SCNC: 24 MMOL/L (ref 23–31)
CREAT CL PREDICTED SERPL C-G-VRATE: 47 ML/MIN (ref 70–130)
CREAT CL PREDICTED SERPL C-G-VRATE: 48 ML/MIN (ref 70–130)
GLUCOSE SERPL-MCNC: 193 MG/DL (ref 80–115)
GLUCOSE SERPL-MCNC: 60 MG/DL (ref 80–115)
HGB BLD-MCNC: 10.1 G/DL (ref 12–16)
HGB BLD-MCNC: 9.5 G/DL (ref 12–16)
MCH RBC QN AUTO: 29.4 PG (ref 27–31)
MCH RBC QN AUTO: 29.9 PG (ref 27–31)
MCV RBC AUTO: 91.4 FL (ref 78–98)
MCV RBC AUTO: 92.1 FL (ref 78–98)
MDIFF COMPLETE?: YES
MDIFF COMPLETE?: YES
PLATELET # BLD AUTO: 125 THOU/UL (ref 130–400)
PLATELET # BLD AUTO: 147 THOU/UL (ref 130–400)
POTASSIUM SERPL-SCNC: 3.7 MMOL/L (ref 3.5–5.1)
POTASSIUM SERPL-SCNC: 3.9 MMOL/L (ref 3.5–5.1)
RBC # BLD AUTO: 3.23 MILL/UL (ref 4.2–5.4)
RBC # BLD AUTO: 3.39 MILL/UL (ref 4.2–5.4)
SODIUM SERPL-SCNC: 138 MMOL/L (ref 136–145)
SODIUM SERPL-SCNC: 141 MMOL/L (ref 136–145)
WBC # BLD AUTO: 2.4 THOU/UL (ref 4.8–10.8)
WBC # BLD AUTO: 3.8 THOU/UL (ref 4.8–10.8)

## 2020-05-22 RX ADMIN — Medication SCH: at 08:23

## 2020-05-22 RX ADMIN — Medication SCH: at 20:49

## 2020-05-22 RX ADMIN — MEROPENEM AND SODIUM CHLORIDE SCH MLS: 1 INJECTION, SOLUTION INTRAVENOUS at 04:06

## 2020-05-22 RX ADMIN — MEROPENEM AND SODIUM CHLORIDE SCH MLS: 1 INJECTION, SOLUTION INTRAVENOUS at 11:58

## 2020-05-22 RX ADMIN — INSULIN HUMAN PRN UNIT: 100 INJECTION, SOLUTION PARENTERAL at 17:44

## 2020-05-22 RX ADMIN — INSULIN HUMAN SCH: 100 INJECTION, SUSPENSION SUBCUTANEOUS at 08:22

## 2020-05-22 RX ADMIN — VANCOMYCIN HYDROCHLORIDE SCH MG: KIT at 05:46

## 2020-05-22 RX ADMIN — ALUMINUM ZIRCONIUM TRICHLOROHYDREX GLY SCH EACH: 0.2 STICK TOPICAL at 11:58

## 2020-05-22 NOTE — PDOC.EVN
Event Note





- Event Note


Event Note: 





Nurse called, reported new onset diarrhea x 5 stools and fever. Will get sepsis 

workup

## 2020-05-22 NOTE — PRG
DATE OF SERVICE:  05/22/2020



SUBJECTIVE:  The patient is still having hematuria and dysuria, started with

diarrhea.  No headaches.  No visual symptoms.  No sore throat, odynophagia, or

dysphagia.  No abdominal pain. 



OBJECTIVE:  VITAL SIGNS:  T-max 100.5, /69, O2 saturations 99, pulse 80, 
and

respirations 16. 

GENERAL:  Appears in no acute distress. 

HEENT:  Ocular movements conjugate. 

LUNGS:  Symmetric clear breath sounds. 

HEART:  S1 and S2.  Regular rate.  No S3 or S4. 

ABDOMEN:  Soft, not distended, tender.  Mild suprapubic distention. 

EXTREMITIES:  Moves extremities equally.



LABORATORY DATA:  White cell count 3.8, hemoglobin 10.1, platelets 147.  Sodium 
141

and creatinine 1.21.  Microbiology show Candida krusei. 



ASSESSMENT AND DISCUSSION:  Type 2 diabetes, CLL on anti-CD20 monoclonal 
antibody

treatment with improvement in lymphadenopathy still stents for management of

obstructive uropathy associated with lymphoma, sepsis due to E. faecalis in 
March.

Now, she has Candida krusei infection.  We will contact Urology, Dr. Bahena

regarding the above.  Discontinue meropenem and continue voriconazole. Add 
Micafungin. Although 

urinary tract penetration is limited, clinical reported experience has been 
positive.







Job ID:  [id number]



[facility]

## 2020-05-22 NOTE — RAD
CHEST 1 VIEW:

 

Date:  05/22/2020

 

HISTORY:  

Fever. 

 

COMPARISON:  

Radiograph dated 03/19/2020. 

 

FINDINGS:

Port catheter is in place with tip at the inferior SVC. Lungs are clear. No pneumothorax. No effusion
. No acute osseous abnormality. 

 

IMPRESSION: 

No acute intrathoracic abnormality. 

 

 

POS: HOME

## 2020-05-22 NOTE — PDOC.HOSPP
- Subjective


Encounter Date: 05/22/20


Subjective: 


The patient complains of hematuria that has been present for the past 2 days.





- Objective


Vital Signs & Weight: 


 Vital Signs (12 hours)











  Temp Pulse Resp BP BP BP Pulse Ox


 


 05/22/20 08:17        99


 


 05/22/20 07:47  98.7 F  80  16  110/69  88/52 L  105/52 L  99








 Weight











Admit Weight                   144 lb


 


Weight                         144 lb 8 oz














I&O: 


 











 05/21/20 05/22/20 05/23/20





 06:59 06:59 06:59


 


Intake Total  2575 


 


Balance  2575 











Result Diagrams: 


 05/22/20 05:53





 05/22/20 05:53


Additional Labs: 


 Accuchecks











  05/22/20 05/22/20 05/22/20





  16:45 11:21 08:26


 


POC Glucose  277 H  158 H  101














  05/22/20 05/22/20 05/21/20





  06:26 05:43 20:03


 


POC Glucose  96  52 L*  168 H














Hospitalist ROS





- Medication


Medications: 


Active Medications











Generic Name Dose Route Start Last Admin





  Trade Name Freq  PRN Reason Stop Dose Admin


 


Acetaminophen  650 mg  05/20/20 18:20  05/22/20 02:20





  Tylenol  PO   650 mg





  Q4H PRN   Administration





  Headache/Fever/Mild Pain (1-3)   





     





     





     


 


Apixaban  5 mg  05/20/20 21:00  05/22/20 08:19





  Eliquis  PO   5 mg





  BID MARY   Administration





     





     





     





     


 


Dextrose/Water  25 gm  05/21/20 17:04  05/21/20 17:11





  Dextrose 50%  IVP   25 gm





  PRN PRN   Administration





  HYPOGLYCEMIA PROTOCOL   





     





     





     


 


Ezetimibe  10 mg  05/20/20 21:00  05/21/20 20:59





  Zetia  PO   10 mg





  HS MARY   Administration





     





     





     





     


 


Gabapentin  600 mg  05/20/20 21:00  05/22/20 15:25





  Neurontin  PO   600 mg





  TID MARY   Administration





     





     





     





     


 


Sodium Chloride  1,000 mls @ 100 mls/hr  05/22/20 10:15  05/22/20 11:57





  1/2 Normal Saline  IV   1,000 mls





  .Q10H MARY   Administration





     





     





     





     


 


Micafungin Sodium 100 mg/  100 mls @ 100 mls/hr  05/22/20 17:00  05/22/20 17:41





  Sodium Chloride  IVPB   100 mls





  1700 MARY   Administration





     





     





     





     


 


Insulin Human Regular  0 units  05/21/20 17:04  05/22/20 17:44





  Humulin R  SC   4 unit





  .MILD SLIDING PRN   Administration





  MILD SLIDING SCALE   





     





  Protocol   





     


 


Venclexta Patient's  0 each  05/21/20 12:00  05/22/20 11:58





  Home Medication  PO   1 each





  1200 MARY   Administration





     





     





     





     


 


Sodium Chloride  10 ml  05/21/20 21:00  05/22/20 08:23





  Flush - Normal Saline  IVF   Not Given





  Q12HR MARY   





     





     





     





     


 


Tizanidine HCl  2 mg  05/21/20 10:27  05/21/20 21:05





  Zanaflex  PO   2 mg





  TID PRN   Administration





  Muscle Spasm   





     





     





     


 


Voriconazole  200 mg  05/21/20 21:00  05/22/20 08:26





  Vfend  PO   200 mg





  Q12HR MARY   Administration





     





     





     





     














- Exam


General Appearance: NAD, awake alert


ENT: normocephalic atraumatic


Neck: supple, no JVD


Respiratory: normal chest expansion, no tachypnea


Extremities: no cyanosis, no clubbing, no edema


Neurological: cranial nerve grossly intact, no focal deficits





Hosp A/P





- Plan





1.  Complicated fungal UTI.


2.  Immunosuppressed status due to CLL.


3.  Lower abdominal pain due to cystitis.


4.  Dizziness with low blood pressure.


5.  Diabetes mellitus on insulin.


6. Hematuria.


7. Hypoglycemia.








Continue IV fluids.


The patient is on voriconazole, meropenem, and oral vancomycin per ID 

recommendations.


Urine cultures are pending.


Hematuria is likely related to her UTI.


H&H stable.


If her symptoms persist, we will consult her urologist.


Insulin has been placed on hold due to hypoglycemia.

## 2020-05-23 LAB
ANION GAP SERPL CALC-SCNC: 9 MMOL/L (ref 10–20)
BUN SERPL-MCNC: 18 MG/DL (ref 9.8–20.1)
CALCIUM SERPL-MCNC: 7.5 MG/DL (ref 7.8–10.44)
CHLORIDE SERPL-SCNC: 111 MMOL/L (ref 98–107)
CO2 SERPL-SCNC: 24 MMOL/L (ref 23–31)
CREAT CL PREDICTED SERPL C-G-VRATE: 49 ML/MIN (ref 70–130)
GLUCOSE SERPL-MCNC: 134 MG/DL (ref 80–115)
HGB BLD-MCNC: 9.5 G/DL (ref 12–16)
MCH RBC QN AUTO: 28.5 PG (ref 27–31)
MCV RBC AUTO: 91.9 FL (ref 78–98)
MDIFF COMPLETE?: YES
PLATELET # BLD AUTO: 128 THOU/UL (ref 130–400)
POTASSIUM SERPL-SCNC: 3.8 MMOL/L (ref 3.5–5.1)
RBC # BLD AUTO: 3.34 MILL/UL (ref 4.2–5.4)
SODIUM SERPL-SCNC: 140 MMOL/L (ref 136–145)
WBC # BLD AUTO: 2.6 THOU/UL (ref 4.8–10.8)

## 2020-05-23 RX ADMIN — INSULIN HUMAN PRN UNIT: 100 INJECTION, SOLUTION PARENTERAL at 17:32

## 2020-05-23 RX ADMIN — ALUMINUM ZIRCONIUM TRICHLOROHYDREX GLY SCH EACH: 0.2 STICK TOPICAL at 12:04

## 2020-05-23 RX ADMIN — INSULIN HUMAN PRN UNIT: 100 INJECTION, SOLUTION PARENTERAL at 12:05

## 2020-05-23 RX ADMIN — VANCOMYCIN HYDROCHLORIDE SCH MG: KIT at 17:29

## 2020-05-23 RX ADMIN — Medication SCH: at 20:44

## 2020-05-23 RX ADMIN — Medication SCH ML: at 09:48

## 2020-05-23 NOTE — CON
DATE OF CONSULTATION:  05/23/2020



REASON FOR CONSULTATION:  Urinary tract infection, bilateral ureteral obstruction

with stents. 



HISTORY OF PRESENT ILLNESS:  Ms. Carrell is a 65-year-old female who has obstructive

uropathy from lymphadenopathy from CLL.  She had ureteral stents placed in

approximately 02/2020 by Dr. Bahena.  In the records, an exact date has not been

seen by me as they were placed at [facility] in [facility].  She most

recently developed enterococcal urosepsis in 03/2020, and was treated with IV

antibiotics.  She was discharged home on IV antibiotics, but recently developed a

fever, dysuria, and gross hematuria again.  She has since been admitted here at [facility], and has had imaging and laboratory study.  Her urine culture has grown

Candida krusei.  CT scan demonstrates improved lymphadenopathy.  Both ureteral

stents are in good position.  She has burning at the end of her stream, continues to

have hematuria. 



PAST MEDICAL HISTORY:  

1. Type 2 diabetes.

2. CLL, currently on anti-CD20 monoclonal antibody therapy.

3. Hypertension.

4. Prior history of cervical cancer status post hysterectomy.

5. Fibromyalgia.

6. Anxiety.

7. Depression.



PAST SURGICAL HISTORY:  Includes:

1. Hysterectomy.

2. Appendectomy.

3. Bladder lift surgery.

4. Ureteral stent placement.

5. Lumpectomy.



SOCIAL HISTORY:  She is currently not employed.  She has a history of smoking in the

past. 



FAMILY HISTORY:  Noncontributory.



ALLERGIES:  INCLUDE KEFLEX, PENICILLIN, ASPIRIN, AND SHE APPARENTLY DEVELOPED

URTICARIA LIKE SYMPTOMS AFTER RECENT TRIAL OF DIFLUCAN AS AN OUTPATIENT. 



MEDICATIONS:  Chronic medications at home include:

1. Neurontin.

2. Insulin.

3. Tizanidine.

4. Eliquis. 

She was given meropenem when she presented to the hospital, but this has been

discontinued and she is on micafungin now. 



REVIEW OF SYSTEMS:  RESPIRATORY:  No shortness of breath. 

CARDIOVASCULAR:  Denies chest pain or palpitations. 

GASTROINTESTINAL:  She has recently redeveloped some diarrhea during this

hospitalization.  She has been diagnosed with C difficile in 03/2020 and was treated

with oral vancomycin. 

GENITOURINARY:  Please see history of present illness.



PHYSICAL EXAMINATION:

GENERAL:  She is awake and alert.  She is in no distress at this time. 

VITAL SIGNS:  Most recent temperature 98.7, pulse 89, respiratory rate 16, room air

saturation 100%. 

ABDOMEN:  Soft and nontender.  No palpable masses.  Liver and spleen not palpable.

No abdominal tenderness noted. 

EXTREMITIES:  No edema noted. 

GENITOURINARY:  Deferred. 

CHEST:  Clear to auscultation. 

CARDIOVASCULAR:  No murmurs.



MICROBIOLOGY DATA:  Candida krusei in the urine.



LABORATORY DATA:  White blood cell count 2.6, hemoglobin 9.5, and hematocrit 30.7.

Creatinine 1.16. 



IMPRESSION:  Ms. Carrell is a 65-year-old female with CLL on CD20 monoclonal

antibody therapy.  At the time of her diagnosis, she was noted to have bilateral

hydronephrosis from lymphadenopathy and stents were placed, I believe, in

approximately 02/2020.  She had an enterococcal urosepsis in March, and now appears

to have fungal urosepsis.  I think it would be wise to change her stents while she

was receiving antifungal therapy to prevent recurrence from fungal seeding of the

current double-J stents.  Ideally, this should be performed while she is in the

hospital to prevent return trip for stent removal or replacement. 



RECOMMENDATIONS:  Stent removal and replacement while on culture appropriate therapy.







Job ID:  [id number]

## 2020-05-23 NOTE — PRG
DATE OF SERVICE:  05/23/2020



SUBJECTIVE:  Ms. Aleman is still with dysuria and still having diarrhea.

Apparently, she was seen by Dr. Bahena or his assistant and/or may be one of the

urologists covering for Dr. Bahena.  Apparently, there is a plan of eventually

replacing the stents before she was discharged.  No respiratory symptoms.  No

vomiting.  No joint symptoms.  She did have a temperature elevation up to 101.6 at 8

p.m., yesterday.  She is now afebrile. 



OBJECTIVE:  VITAL SIGNS:  Blood pressure 140/70, pulse 89, respirations 16, and O2

saturation is 100. 

GENERAL:  Awake, alert, and oriented. 

LUNGS:  Clear. 

HEART:  S1 and S2, regular rate. 

ABDOMEN:  Soft with mild suprapubic tenderness. 

EXTREMITIES:  Moves all extremities equally.



LABORATORY DATA:  White cell count 2.6, hemoglobin 9.5, platelets 128, 13%

neutrophils, 13% bands, 64% lymphocytes.  Sodium 140, creatinine is at 1.16, which

is down from admission.  2 sets of blood cultures submitted yesterday are no growth

thus far. 



She also had a chest x-ray, I think probably because of the febrile episode, it did

not show any abnormalities. 



ASSESSMENT AND DISCUSSION:  Type 2 diabetes, chronic lymphocytic leukemia on

anti-CD20 monoclonal antibody treatment with improvement in lymphadenopathy, stents

for management of obstructive uropathy associated with lymphoma.  Previous

Enterococcus faecalis sepsis in March, now with Candida krusei infection in the

urinary 

tract, which failed outpatient treatment due to Diflucan allergy.  Candida krusei is

not responsive to Diflucan anyways.  We did not know if there were species involved.

 Now, she is on voriconazole and micafungin to be continued, and our endpoint here

will be resolution of the diarrhea, which is secondary to Clostridium difficile

probably.  She is on vancomycin.  We will have to increase the vancomycin to 4 times

a day. 





Job ID:  [id number]

## 2020-05-23 NOTE — PDOC.HOSPP
- Subjective


Encounter Date: 05/23/20


Encounter Time: 14:45


Subjective: 





f/u for Candida UTI on Voriconazole/Micafungin. Pt reports bloody urine most 

notable in am currently on Eliquis for A-fib. Also tx for C. diff with po 

Vancomycin.





- Objective


Vital Signs & Weight: 


 Vital Signs (12 hours)











  Temp Pulse Resp BP BP Pulse Ox


 


 05/23/20 11:18  98.7 F  89  16   145/75 H  100


 


 05/23/20 07:47  98.7 F  84  16  128/69   97


 


 05/23/20 03:17  98.3 F  77  18   123/70  100








 Weight











Admit Weight                   144 lb


 


Weight                         144 lb 8 oz














I&O: 


 











 05/22/20 05/23/20 05/24/20





 06:59 06:59 06:59


 


Intake Total 2575 1900 


 


Balance 2575 1900 











Result Diagrams: 


 05/23/20 05:49





 05/23/20 05:49


Additional Labs: 


 Accuchecks











  05/23/20 05/23/20 05/22/20





  11:25 05:31 20:13


 


POC Glucose  234 H  135 H  197 H














  05/22/20





  16:45


 


POC Glucose  277 H








Microbiology





05/22/20 10:30   Stool - Loose   Stool Lactoferrin - Final


05/22/20 10:30   Stool - Loose   Rapid Parasite Screen - Final


05/22/20 10:30   Stool - Loose   Campylobacter Antigen Assay - Final


05/22/20 10:30   Stool - Loose   Shiga Toxin Test - Final


05/22/20 10:30   Stool   C. difficile GDH Antigen & Toxins - Final


05/20/20 14:36   Urine voided   Urine Culture - Final


                              Presumptive Ann Krusei


05/22/20 22:21   Venous blood - Right Hand   Blood Culture - Preliminary


                              Specimen has been received and culture in 

progress.


                              No Growth to date.


05/22/20 22:19   Venous blood - Right Arm   Blood Culture - Preliminary


                              Specimen has been received and culture in 

progress.


                              No Growth to date.








Radiology Reviewed by me: Yes (PCXR - negative)





Hospitalist ROS





- Medication


Medications: 


Active Medications











Generic Name Dose Route Start Last Admin





  Trade Name Freq  PRN Reason Stop Dose Admin


 


Acetaminophen  650 mg  05/20/20 18:20  05/22/20 20:47





  Tylenol  PO   650 mg





  Q4H PRN   Administration





  Headache/Fever/Mild Pain (1-3)   





     





     





     


 


Apixaban  5 mg  05/20/20 21:00  05/23/20 09:47





  Eliquis  PO   5 mg





  BID MARY   Administration





     





     





     





     


 


Dextrose/Water  25 gm  05/21/20 17:04  05/21/20 17:11





  Dextrose 50%  IVP   25 gm





  PRN PRN   Administration





  HYPOGLYCEMIA PROTOCOL   





     





     





     


 


Ezetimibe  10 mg  05/20/20 21:00  05/22/20 20:49





  Zetia  PO   10 mg





  HS MARY   Administration





     





     





     





     


 


Gabapentin  600 mg  05/20/20 21:00  05/23/20 09:47





  Neurontin  PO   600 mg





  TID MARY   Administration





     





     





     





     


 


Sodium Chloride  1,000 mls @ 100 mls/hr  05/22/20 10:15  05/23/20 05:40





  1/2 Normal Saline  IV   Not Given





  .Q10H MARY   





     





     





     





     


 


Micafungin Sodium 100 mg/  100 mls @ 100 mls/hr  05/22/20 17:00  05/22/20 17:41





  Sodium Chloride  IVPB   100 mls





  1700 MARY   Administration





     





     





     





     


 


Insulin Human Regular  0 units  05/21/20 17:04  05/23/20 12:05





  Humulin R  SC   3 unit





  .MILD SLIDING PRN   Administration





  MILD SLIDING SCALE   





     





  Protocol   





     


 


Venclexta Patient's  0 each  05/21/20 12:00  05/23/20 12:04





  Home Medication  PO   1 each





  1200 MARY   Administration





     





     





     





     


 


Sodium Chloride  10 ml  05/21/20 21:00  05/23/20 09:48





  Flush - Normal Saline  IVF   10 ml





  Q12HR MARY   Administration





     





     





     





     


 


Tizanidine HCl  2 mg  05/21/20 10:27  05/22/20 20:47





  Zanaflex  PO   2 mg





  TID PRN   Administration





  Muscle Spasm   





     





     





     


 


Vancomycin HCl  125 mg  05/23/20 09:00  05/23/20 09:48





  First Vancomycin  PO   125 mg





  DAILY MARY   Administration





     





     





     





     


 


Voriconazole  200 mg  05/21/20 21:00  05/23/20 11:08





  Vfend  PO   Not Given





  Q12HR MARY   





     





     





     





     














- Exam


General Appearance: NAD, awake alert


Eye: PERRL, anicteric sclera


ENT: normocephalic atraumatic, no oropharyngeal lesions


Neck: supple, symmetric, no JVD, no thyromegaly


Heart: RRR, no gallops, no rubs, normal peripheral pulses


Heart - other findings: S1, S2


Respiratory: CTAB, no wheezes, no rales, no ronchi, normal chest expansion


Gastrointestinal: soft, non-tender, non-distended, normal bowel sounds, no 

palpable masses


Extremities: no cyanosis, no clubbing, no edema


Skin: normal turgor, no lesions


Neurological: cranial nerve grossly intact, no new deficit


Musculoskeletal: normal tone, normal strength, no muscle wasting


Psychiatric: normal affect, A&O x 3





Hosp A/P


(1) Candida UTI


Code(s): B37.49 - OTHER UROGENITAL CANDIDIASIS   Status: Acute   


Plan: 


Continue Voriconazol/Micafungin, consider ureteral stent exchange, hold Eliquis








(2) Clostridium difficile diarrhea


Code(s): A04.72 - ENTEROCOLITIS D/T CLOSTRIDIUM DIFFICILE, NOT SPCF AS RECUR   

Status: Acute   


Plan: 


Continue po Vancomycin, contact precautions








(3) Acute kidney injury


Code(s): N17.9 - ACUTE KIDNEY FAILURE, UNSPECIFIED   Status: Acute   


Plan: 


Improved, continue IVF's, avoid nephrotoxic meds and limit contrast








(4) Chronic lymphocytic leukemia (CLL), B-cell


Code(s): C91.10 - CHRONIC LYMPHOCYTIC LEUK OF B-CELL TYPE NOT ACHIEVE REMIS   

Status: Chronic   


Plan: 


Continue chemotherapeutics as outpt








- Plan


continue antibiotics, out of bed/ambulate, DVT proph w/SCDs





Stable overall


Hold Eliquis


Continue Voriconazole/Micafungin


OOB/ambulate


Contiue Vancomycin po


AM lab: BMP, CBC

## 2020-05-24 LAB
ANION GAP SERPL CALC-SCNC: 10 MMOL/L (ref 10–20)
BUN SERPL-MCNC: 15 MG/DL (ref 9.8–20.1)
CALCIUM SERPL-MCNC: 7.5 MG/DL (ref 7.8–10.44)
CHLORIDE SERPL-SCNC: 111 MMOL/L (ref 98–107)
CO2 SERPL-SCNC: 23 MMOL/L (ref 23–31)
CREAT CL PREDICTED SERPL C-G-VRATE: 59 ML/MIN (ref 70–130)
GLUCOSE SERPL-MCNC: 172 MG/DL (ref 80–115)
HGB BLD-MCNC: 9.3 G/DL (ref 12–16)
MCH RBC QN AUTO: 29.6 PG (ref 27–31)
MCV RBC AUTO: 91.5 FL (ref 78–98)
MDIFF COMPLETE?: YES
PLATELET # BLD AUTO: 124 THOU/UL (ref 130–400)
POTASSIUM SERPL-SCNC: 4.3 MMOL/L (ref 3.5–5.1)
RBC # BLD AUTO: 3.14 MILL/UL (ref 4.2–5.4)
SODIUM SERPL-SCNC: 140 MMOL/L (ref 136–145)
WBC # BLD AUTO: 2.5 THOU/UL (ref 4.8–10.8)

## 2020-05-24 RX ADMIN — VANCOMYCIN HYDROCHLORIDE SCH MG: KIT at 00:09

## 2020-05-24 RX ADMIN — INSULIN HUMAN PRN UNIT: 100 INJECTION, SOLUTION PARENTERAL at 17:23

## 2020-05-24 RX ADMIN — VANCOMYCIN HYDROCHLORIDE SCH MG: KIT at 18:03

## 2020-05-24 RX ADMIN — VANCOMYCIN HYDROCHLORIDE SCH MG: KIT at 06:20

## 2020-05-24 RX ADMIN — ALUMINUM ZIRCONIUM TRICHLOROHYDREX GLY SCH EACH: 0.2 STICK TOPICAL at 11:35

## 2020-05-24 RX ADMIN — Medication SCH ML: at 09:31

## 2020-05-24 RX ADMIN — Medication SCH: at 21:38

## 2020-05-24 RX ADMIN — INSULIN HUMAN PRN UNIT: 100 INJECTION, SOLUTION PARENTERAL at 06:21

## 2020-05-24 RX ADMIN — VANCOMYCIN HYDROCHLORIDE SCH MG: KIT at 11:36

## 2020-05-24 NOTE — PDOC.HOSPP
- Subjective


Encounter Date: 05/24/20


Encounter Time: 16:55


Subjective: 





f/u for fungal UTI with Candida krusei on Voriconazole/Micafungin. Plan for 

exchange of ureteral stents per Urology. Eliquis on hold currently. Urine more 

clear per pt report.





- Objective


Vital Signs & Weight: 


 Vital Signs (12 hours)











  Temp Pulse Resp BP Pulse Ox


 


 05/24/20 08:00      99


 


 05/24/20 07:52  99.3 F  86  16  150/74 H  99








 Weight











Admit Weight                   144 lb


 


Weight                         144 lb 8 oz














I&O: 


 











 05/23/20 05/24/20 05/25/20





 06:59 06:59 06:59


 


Intake Total 1900 1600 


 


Balance 1900 1600 











Result Diagrams: 


 05/24/20 05:37





 05/24/20 05:37


Additional Labs: 


 Accuchecks











  05/24/20 05/24/20 05/23/20





  11:25 05:15 21:24


 


POC Glucose  189 H  224 H  128 H














  05/23/20





  16:32


 


POC Glucose  225 H








Microbiology





05/22/20 10:30   Stool - Loose   Stool Lactoferrin - Final


05/22/20 10:30   Stool - Loose   Rapid Parasite Screen - Final


05/22/20 10:30   Stool - Loose   Campylobacter Antigen Assay - Final


05/22/20 10:30   Stool - Loose   Shiga Toxin Test - Final


05/22/20 10:30   Stool   C. difficile GDH Antigen & Toxins - Final


05/20/20 14:36   Urine voided   Urine Culture - Final


                              Presumptive Ann Krusei


05/22/20 22:21   Venous blood - Right Hand   Blood Culture - Preliminary


                              Specimen has been received and culture in 

progress.


                              No Growth to date.


05/22/20 22:19   Venous blood - Right Arm   Blood Culture - Preliminary


                              Specimen has been received and culture in 

progress.


                              No Growth to date.











Hospitalist ROS





- Medication


Medications: 


Active Medications











Generic Name Dose Route Start Last Admin





  Trade Name Freq  PRN Reason Stop Dose Admin


 


Acetaminophen  650 mg  05/20/20 18:20  05/22/20 20:47





  Tylenol  PO   650 mg





  Q4H PRN   Administration





  Headache/Fever/Mild Pain (1-3)   





     





     





     


 


Dextrose/Water  25 gm  05/21/20 17:04  05/21/20 17:11





  Dextrose 50%  IVP   25 gm





  PRN PRN   Administration





  HYPOGLYCEMIA PROTOCOL   





     





     





     


 


Ezetimibe  10 mg  05/20/20 21:00  05/23/20 20:44





  Zetia  PO   10 mg





  HS MARY   Administration





     





     





     





     


 


Gabapentin  600 mg  05/20/20 21:00  05/24/20 15:17





  Neurontin  PO   600 mg





  TID MARY   Administration





     





     





     





     


 


Sodium Chloride  1,000 mls @ 100 mls/hr  05/22/20 10:15  05/24/20 09:31





  1/2 Normal Saline  IV   1,000 mls





  .Q10H MARY   Administration





     





     





     





     


 


Micafungin Sodium 100 mg/  100 mls @ 100 mls/hr  05/22/20 17:00  05/23/20 17:28





  Sodium Chloride  IVPB   100 mls





  1700 MARY   Administration





     





     





     





     


 


Insulin Human Regular  0 units  05/21/20 17:04  05/24/20 06:21





  Humulin R  SC   3 unit





  .MILD SLIDING PRN   Administration





  MILD SLIDING SCALE   





     





  Protocol   





     


 


Venclexta Patient's  0 each  05/21/20 12:00  05/24/20 11:35





  Home Medication  PO   1 each





  1200 MARY   Administration





     





     





     





     


 


Sertraline HCl  100 mg  05/24/20 09:00  05/24/20 08:07





  Zoloft  PO   100 mg





  DAILY MARY   Administration





     





     





     





     


 


Sodium Chloride  10 ml  05/21/20 21:00  05/24/20 09:31





  Flush - Normal Saline  IVF   10 ml





  Q12HR MARY   Administration





     





     





     





     


 


Tizanidine HCl  2 mg  05/21/20 10:27  05/23/20 21:16





  Zanaflex  PO   2 mg





  TID PRN   Administration





  Muscle Spasm   





     





     





     


 


Vancomycin HCl  125 mg  05/23/20 18:00  05/24/20 11:36





  First Vancomycin  PO   125 mg





  Q6HR MARY   Administration





     





     





     





     


 


Voriconazole  200 mg  05/21/20 21:00  05/24/20 09:31





  Vfend  PO   200 mg





  Q12HR MARY   Administration





     





     





     





     














- Exam


General Appearance: NAD, awake alert


Eye: PERRL, anicteric sclera


ENT: normocephalic atraumatic, no oropharyngeal lesions


Neck: supple, symmetric, no JVD, no thyromegaly


Heart: RRR, no gallops, no rubs, normal peripheral pulses


Heart - other findings: S1, S2


Respiratory: CTAB, no wheezes, no rales, no ronchi, normal chest expansion


Gastrointestinal: soft, non-tender, non-distended, normal bowel sounds


Extremities: no cyanosis, no clubbing, no edema


Skin: normal turgor, no lesions


Neurological: cranial nerve grossly intact, no new deficit


Musculoskeletal: normal tone, normal strength, no muscle wasting


Psychiatric: normal affect, A&O x 3





Hosp A/P


(1) Candida UTI


Code(s): B37.49 - OTHER UROGENITAL CANDIDIASIS   Status: Acute   


Plan: 


Continue Voriconazole/Micafungin, plan for outpt mgmt with Voriconazole pending 

insurance approval








(2) Clostridium difficile diarrhea


Code(s): A04.72 - ENTEROCOLITIS D/T CLOSTRIDIUM DIFFICILE, NOT SPCF AS RECUR   

Status: Acute   


Plan: 


Continue Vancomycin po, add Florastor








(3) Acute kidney injury


Code(s): N17.9 - ACUTE KIDNEY FAILURE, UNSPECIFIED   Status: Acute   





(4) Chronic lymphocytic leukemia (CLL), B-cell


Code(s): C91.10 - CHRONIC LYMPHOCYTIC LEUK OF B-CELL TYPE NOT ACHIEVE REMIS   

Status: Chronic   





- Plan


plan discussed w/ family, continue antibiotics, PT/OT, , out of 

bed/ambulate, DVT proph w/SCDs





Stable overall


Hold Eliquis


Continue Voriconazole/Micafungin


OOB/ambulate


Contiue Vancomycin po


Add Florastor 250mg po daily


AM lab: BMP, CBC


Plan for ureteral exchange in next 24-48h

## 2020-05-24 NOTE — PRG
DATE OF SERVICE:  05/24/2020



SUBJECTIVE:  Diarrhea that has been persistent.  She had

a fever yesterday, but none today. Urine is clearing



OBJECTIVE:  VITAL SIGNS:  Temperature 98.5, blood pressure 132/74, pulse 82,

respiratory rate 18, O2 saturation 96%. 

ABDOMEN:  Soft.  No peritoneal signs.



IMPRESSION:  Her hematuria has improved, but she continues to have diarrhea.  
Her

culture was positive and she is on appropriate antibiotic therapy at this point
, as

per Dr. Guzman.  It is likely that her ureteral stents are colonized and if

antibiotic therapy is discontinued, it is likely she will recur with infection.
  For

that reason, I recommended exchanging the ureteral stents while she is on 
antibiotic

therapy.  It is unclear to me when her discharge is anticipated.  I will 
schedule

stent exchange tomorrow to prevent delaying discharge. 



PLAN:  Cystoscopy, stent removal and replacement.  I have discussed the 
procedure,

potential limitations, alternatives, and complications.  She does wish to 
proceed. 







Job ID:  [id number]



MTDD

## 2020-05-24 NOTE — PRG
DATE OF SERVICE:  05/24/2020



SUBJECTIVE:  Starting to feel improvement in the cystitis related pain.  The 
stool

is getting a bit harder.  No respiratory symptoms. 



OBJECTIVE:  VITAL SIGNS:  T max 99.3 and blood pressure 150/74. 

LUNGS:  Clear. 

HEART:  S1 and S2.  Regular rate. 

ABDOMEN:  Mild tenderness in suprapubic area.



LABORATORY DATA:  White cell count is at 2.5, hemoglobin 9.3, platelets 124.  
Sodium

140, creatinine 0.96.  The toxigenic C difficile PCR toxin was negative at this

point. 



ASSESSMENT AND DISCUSSION:  Chronic lymphocytic leukemia, on anti-CD20 inhibitor

with improvement in lymphadenopathy associated renal obstruction.  Stents in 
place,

need to be replaced.  Dr. Davison apparently will do it tomorrow or maybe the

day after.  The plan is to discharge her on oral voriconazole, which needs to be

approved by her insurance company and need to fill preauthorization and we will 
go

ahead and submit voriconazole level and may have to increase the dosage. 







Job ID:  [id number]



MTDD

## 2020-05-25 LAB
ANION GAP SERPL CALC-SCNC: 9 MMOL/L (ref 10–20)
ANISOCYTOSIS BLD QL SMEAR: (no result) (100X)
BUN SERPL-MCNC: 15 MG/DL (ref 9.8–20.1)
CALCIUM SERPL-MCNC: 7.4 MG/DL (ref 7.8–10.44)
CHLORIDE SERPL-SCNC: 110 MMOL/L (ref 98–107)
CO2 SERPL-SCNC: 24 MMOL/L (ref 23–31)
CREAT CL PREDICTED SERPL C-G-VRATE: 57 ML/MIN (ref 70–130)
GLUCOSE SERPL-MCNC: 151 MG/DL (ref 80–115)
HGB BLD-MCNC: 8.6 G/DL (ref 12–16)
MCH RBC QN AUTO: 29.2 PG (ref 27–31)
MCV RBC AUTO: 89.9 FL (ref 78–98)
MDIFF COMPLETE?: YES
PLATELET # BLD AUTO: 117 THOU/UL (ref 130–400)
POTASSIUM SERPL-SCNC: 4.2 MMOL/L (ref 3.5–5.1)
RBC # BLD AUTO: 2.95 MILL/UL (ref 4.2–5.4)
REF LAB NAME: (no result)
REF LAB TEST NAME: (no result)
SODIUM SERPL-SCNC: 139 MMOL/L (ref 136–145)
WBC # BLD AUTO: 2.3 THOU/UL (ref 4.8–10.8)

## 2020-05-25 PROCEDURE — 0TP98DZ REMOVAL OF INTRALUMINAL DEVICE FROM URETER, VIA NATURAL OR ARTIFICIAL OPENING ENDOSCOPIC: ICD-10-PCS | Performed by: UROLOGY

## 2020-05-25 PROCEDURE — 0T788DZ DILATION OF BILATERAL URETERS WITH INTRALUMINAL DEVICE, VIA NATURAL OR ARTIFICIAL OPENING ENDOSCOPIC: ICD-10-PCS | Performed by: UROLOGY

## 2020-05-25 PROCEDURE — BT14ZZZ FLUOROSCOPY OF KIDNEYS, URETERS AND BLADDER: ICD-10-PCS | Performed by: UROLOGY

## 2020-05-25 RX ADMIN — ALUMINUM ZIRCONIUM TRICHLOROHYDREX GLY SCH EACH: 0.2 STICK TOPICAL at 17:27

## 2020-05-25 RX ADMIN — VANCOMYCIN HYDROCHLORIDE SCH MG: KIT at 12:37

## 2020-05-25 RX ADMIN — Medication SCH: at 21:38

## 2020-05-25 RX ADMIN — VANCOMYCIN HYDROCHLORIDE SCH MG: KIT at 00:08

## 2020-05-25 RX ADMIN — Medication SCH: at 08:30

## 2020-05-25 RX ADMIN — INSULIN HUMAN PRN UNIT: 100 INJECTION, SOLUTION PARENTERAL at 21:41

## 2020-05-25 RX ADMIN — VANCOMYCIN HYDROCHLORIDE SCH: KIT at 06:32

## 2020-05-25 RX ADMIN — VANCOMYCIN HYDROCHLORIDE SCH MG: KIT at 17:26

## 2020-05-25 NOTE — RAD
IVP RETROGRADE:

 

History: 

Stent placement. 

 

Comparison: 

CT stone protocol 5-

 

FINDINGS: 

There are bilateral double J ureteral stents in place. Single image was obtained. 

 

IMPRESSION: 

Fluoroscopy for procedural purposes.

 

POS: HOME

## 2020-05-25 NOTE — PDOC.HOSPP
- Subjective


Encounter Date: 05/25/20


Encounter Time: 17:50


Subjective: 





f/u for Fungal UTI with ureteral stent exchange today per Urology. c/o vaginal 

pain this pm. 





- Objective


Vital Signs & Weight: 


 Vital Signs (12 hours)











  Temp Pulse Resp BP Pulse Ox


 


 05/25/20 16:44  98.1 F  90  18  128/62  91 L


 


 05/25/20 08:01  98.8 F  79  19  110/62  94 L








 Weight











Admit Weight                   144 lb


 


Weight                         144 lb 8 oz














I&O: 


 











 05/24/20 05/25/20 05/26/20





 06:59 06:59 06:59


 


Intake Total 1600 1440 


 


Balance 1600 1440 











Result Diagrams: 


 05/25/20 05:08





 05/25/20 05:08


Additional Labs: 


 Accuchecks











  05/25/20 05/25/20 05/25/20





  16:15 12:14 05:34


 


POC Glucose  149 H  132 H  156 H














  05/24/20





  19:52


 


POC Glucose  155 H








Microbiology





05/22/20 10:30   Stool - Loose   Stool Lactoferrin - Final


05/22/20 10:30   Stool - Loose   Rapid Parasite Screen - Final


05/22/20 10:30   Stool - Loose   Campylobacter Antigen Assay - Final


05/22/20 10:30   Stool - Loose   Shiga Toxin Test - Final


05/22/20 10:30   Stool   C. difficile GDH Antigen & Toxins - Final


05/20/20 14:36   Urine voided   Urine Culture - Final


                              Presumptive Ann Krusei


05/22/20 22:21   Venous blood - Right Hand   Blood Culture - Preliminary


                              Specimen has been received and culture in 

progress.


                              No Growth to date.


05/22/20 22:19   Venous blood - Right Arm   Blood Culture - Preliminary


                              Specimen has been received and culture in 

progress.


                              No Growth to date.











Hospitalist ROS





- Medication


Medications: 


Active Medications











Generic Name Dose Route Start Last Admin





  Trade Name Freq  PRN Reason Stop Dose Admin


 


Acetaminophen  650 mg  05/20/20 18:20  05/22/20 20:47





  Tylenol  PO   650 mg





  Q4H PRN   Administration





  Headache/Fever/Mild Pain (1-3)   





     





     





     


 


Dextrose/Water  25 gm  05/21/20 17:04  05/21/20 17:11





  Dextrose 50%  IVP   25 gm





  PRN PRN   Administration





  HYPOGLYCEMIA PROTOCOL   





     





     





     


 


Ezetimibe  10 mg  05/20/20 21:00  05/24/20 21:36





  Zetia  PO   10 mg





  HS MARY   Administration





     





     





     





     


 


Gabapentin  600 mg  05/20/20 21:00  05/25/20 17:28





  Neurontin  PO   Not Given





  TID MARY   





     





     





     





     


 


Sodium Chloride  1,000 mls @ 100 mls/hr  05/22/20 10:15  05/25/20 17:30





  1/2 Normal Saline  IV   1,000 mls





  .Q10H MARY   Administration





     





     





     





     


 


Micafungin Sodium 100 mg/  100 mls @ 100 mls/hr  05/22/20 17:00  05/25/20 17:27





  Sodium Chloride  IVPB   100 mls





  1700 MARY   Administration





     





     





     





     


 


Insulin Human Regular  0 units  05/21/20 17:04  05/24/20 17:23





  Humulin R  SC   5 unit





  .MILD SLIDING PRN   Administration





  MILD SLIDING SCALE   





     





  Protocol   





     


 


Venclexta Patient's  0 each  05/21/20 12:00  05/25/20 17:27





  Home Medication  PO   1 each





  1200 MARY   Administration





     





     





     





     


 


Saccharomyces Boulardii  250 mg  05/25/20 09:00  05/25/20 09:03





  Florastor  PO   250 mg





  DAILY MARY   Administration





     





     





     





     


 


Sertraline HCl  100 mg  05/24/20 09:00  05/25/20 09:03





  Zoloft  PO   100 mg





  DAILY MARY   Administration





     





     





     





     


 


Sodium Chloride  10 ml  05/21/20 21:00  05/25/20 08:30





  Flush - Normal Saline  IVF   Not Given





  Q12HR MARY   





     





     





     





     


 


Tizanidine HCl  2 mg  05/21/20 10:27  05/24/20 22:57





  Zanaflex  PO   2 mg





  TID PRN   Administration





  Muscle Spasm   





     





     





     


 


Vancomycin HCl  125 mg  05/23/20 18:00  05/25/20 17:26





  First Vancomycin  PO   125 mg





  Q6HR MARY   Administration





     





     





     





     


 


Voriconazole  200 mg  05/21/20 21:00  05/25/20 09:03





  Vfend  PO   200 mg





  Q12HR MARY   Administration





     





     





     





     














- Exam


General Appearance: NAD, awake alert


Eye: PERRL, anicteric sclera


ENT: normocephalic atraumatic, no oropharyngeal lesions


Neck: supple, symmetric, no JVD, no thyromegaly


Heart: RRR, no gallops, no rubs, normal peripheral pulses


Heart - other findings: S1, S2


Respiratory: CTAB, no wheezes, no rales, no ronchi, normal chest expansion


Gastrointestinal: soft, non-distended, normal bowel sounds


Gastrointestinal - other findings: mild TTP in suprapubic region


Extremities: no cyanosis, no clubbing, no edema


Skin: normal turgor, no lesions


Neurological: cranial nerve grossly intact, no new deficit


Musculoskeletal: normal tone, normal strength


Psychiatric: normal affect, A&O x 3





Hosp A/P


(1) Candida UTI


Code(s): B37.49 - OTHER UROGENITAL CANDIDIASIS   Status: Acute   


Plan: 


Continue Voriconazole/Micafungin, plan for outpt Voriconazole








(2) Clostridium difficile diarrhea


Code(s): A04.72 - ENTEROCOLITIS D/T CLOSTRIDIUM DIFFICILE, NOT SPCF AS RECUR   

Status: Acute   


Plan: 


Continue Vancomycin po








(3) Acute kidney injury


Code(s): N17.9 - ACUTE KIDNEY FAILURE, UNSPECIFIED   Status: Acute   





(4) Chronic lymphocytic leukemia (CLL), B-cell


Code(s): C91.10 - CHRONIC LYMPHOCYTIC LEUK OF B-CELL TYPE NOT ACHIEVE REMIS   

Status: Chronic   





- Plan


plan discussed w/ family, continue antibiotics, , out of bed/

ambulate, DVT proph w/SCDs





Stable overall


Resume Eliquis in am


Continue Voriconazole/Micafungin


OOB/ambulate


Contiue Vancomycin po


Add Florastor 250mg po daily


AM lab: BMP, H/H


Likely home in am

## 2020-05-25 NOTE — OP
DATE OF PROCEDURE:  05/25/2020



PREOPERATIVE DIAGNOSIS:  Bilateral ureteral obstruction with indwelling stents in

fungi area. 



POSTOPERATIVE DIAGNOSIS:  Bilateral ureteral obstruction with indwelling stents in

fungi area. 



PROCEDURE PERFORMED:  Cystoscopy, bilateral stent removal and replacement.



ANESTHESIA:  General.



INDICATIONS:  Ms. Carrell has had indwelling ureteral stents for bilateral ureteral

obstruction resulting from lymphadenopathy caused by CLL.  She has been admitted to

the hospital this time with urinary tract infection.  Cultures have grown fungal

organisms.  She is now on antibiotic therapy, because the stents were likely to be

colonized.  She is brought to the operating room for stent removal and replacement. 



DESCRIPTION OF PROCEDURE:  The patient was given general anesthesia and IV

antibiotics had been given earlier in the day.  She was sterilely prepped and draped

in the lithotomy position.  Cystoscope was passed in the bladder.  The right

ureteral stent was grasped, brought out to the urethral meatus and a guidewire was

passed through it.  The stent was removed and then a new 6 x 24 double-J stent was

passed over the guide wire and coiled in the renal pelvis and in the bladder as

determined cystoscopically and fluoroscopically.  The left ureteral stent was then

grasped and brought out to the urethral meatus and guidewire was passed through it.

Another 6 x 24 stent was passed over this guidewire and coiled in the left renal

pelvis and in the bladder as determined cystoscopically and fluoroscopically.  The

patient tolerated the 

procedure well.  She was transported from the operating recovery to recovery room in

stable condition. 



COMPLICATION:  None.



ESTIMATED BLOOD LOSS:  Minimal.





Job ID:  [id number]

## 2020-05-26 LAB
ANION GAP SERPL CALC-SCNC: 10 MMOL/L (ref 10–20)
BUN SERPL-MCNC: 21 MG/DL (ref 9.8–20.1)
CALCIUM SERPL-MCNC: 7.7 MG/DL (ref 7.8–10.44)
CHLORIDE SERPL-SCNC: 108 MMOL/L (ref 98–107)
CO2 SERPL-SCNC: 22 MMOL/L (ref 23–31)
CREAT CL PREDICTED SERPL C-G-VRATE: 51 ML/MIN (ref 70–130)
GLUCOSE SERPL-MCNC: 384 MG/DL (ref 80–115)
HGB BLD-MCNC: 8.9 G/DL (ref 12–16)
PLATELET # BLD AUTO: 101 THOU/UL (ref 130–400)
POTASSIUM SERPL-SCNC: 5 MMOL/L (ref 3.5–5.1)
SODIUM SERPL-SCNC: 135 MMOL/L (ref 136–145)

## 2020-05-26 RX ADMIN — INSULIN HUMAN PRN UNIT: 100 INJECTION, SOLUTION PARENTERAL at 12:29

## 2020-05-26 RX ADMIN — VANCOMYCIN HYDROCHLORIDE SCH MG: KIT at 12:26

## 2020-05-26 RX ADMIN — ALUMINUM ZIRCONIUM TRICHLOROHYDREX GLY SCH EACH: 0.2 STICK TOPICAL at 12:27

## 2020-05-26 RX ADMIN — VANCOMYCIN HYDROCHLORIDE SCH MG: KIT at 00:33

## 2020-05-26 RX ADMIN — Medication SCH: at 21:25

## 2020-05-26 RX ADMIN — INSULIN HUMAN PRN UNIT: 100 INJECTION, SOLUTION PARENTERAL at 17:19

## 2020-05-26 RX ADMIN — VANCOMYCIN HYDROCHLORIDE SCH MG: KIT at 06:24

## 2020-05-26 RX ADMIN — Medication SCH: at 09:02

## 2020-05-26 RX ADMIN — VANCOMYCIN HYDROCHLORIDE SCH MG: KIT at 17:18

## 2020-05-26 NOTE — PQF
CARRELL,DOROTHY J COLEERI DO

[id number]                                                             [id number]
[id number]

[id number]                             

                                   

CLINICAL DOCUMENTATION IMPROVEMENT CLARIFICATION FORM:  ICD-10 Updated



PLEASE DO AN ADDENDUM TO THE PROGRESS NOTE WITH ANY DOCUMENTATION UPDATES OR 
ADDITIONS AND CARRY THROUGH TO DC SUMMARY.   THANK YOU.



DATE:    5/26/2020                                     ATTN:DR. DAMION LÓPEZ 



Please exercise your independent, professional judgment in responding to the 
clarification form. Clinical indicators are provided on the bottom of this form 
for your review.



Please check appropriate box(es):



[  ] Sepsis present on admission [ x ] Sepsis NOT present on admission  [  ] 
Unable to determine

 

Due to: Uti  [  ]



Not Due to Uti  [  ]                   



[  ] Septic Shock present on Admission

[  ] Septic Shock NOT present on Admission   [  ] Unable to determine



[ x ] Localized infection without sepsis

[  ] Other diagnosis ___________

[  ] Unable to determine



For continuity of documentation, please document condition throughout progress 
notes and discharge summary.  Thank You.



CLINICAL INDICATORS - SIGNS / SYMPTOMS / LABS   / RESULTS AND LOCATION IN MR



5/20 URINE CULTURE:  PRESUMPTIVE JO ANN KRUSEI



WBC       3.9  >  2.8  >  3.8  >  2.4  >  2.6  >  2.5  >  2.3  

BANDS   3  >  13  >  1  >  11  >  13  >  9  >  3



TEMP  5/22  100.5  >  100.0  >   101.6



5/22 EVENT (DIONNE) NURSE CALLED, REPORTED NEW ONSET DIARRHEA X 5 STOOLS AND 
FEVER. WILL GET SEPSIS WORKUP.



5/24 PN (JAMEY)  SHE HAD AN ENTEROCOCCAL UROSEPSIS IN MARCH, AND NOW APPEARS 
TO HAVE FUNGAL UROSEPSIS.



RISK:

FUNGAL URINARY TRACT INFECTION, IMMUNOCOMPROMISED ( H&P/ALNAZEER) 5/20

           

TREATMENTS:

ID CONSULT ( ASHISH/ 5/21) 

STENT EXCHANGE/CYSTOSCOPY) 5/25





THANK YOU!  RAJAN 



(This form is maintained as a part of the permanent medical record)

 2015 [employer].  All Rights Reserved

VIMAL [email or web address]    Cell Phone: [phone]

                                                              

[facility]

## 2020-05-26 NOTE — PDOC.HOSPP
- Subjective


Encounter Date: 05/26/20


Encounter Time: 17:55


Subjective: 





f/u for fungal UTI with Candida krusei on Voriconazole/Micafungin s/p exchange 

of bilateral ureteral stents. Feels better overall and urine clearing today.





- Objective


Vital Signs & Weight: 


 Vital Signs (12 hours)











  Temp Pulse Resp BP Pulse Ox


 


 05/26/20 08:00      94 L


 


 05/26/20 07:35  97.9 F  78  15  128/68  94 L








 Weight











Admit Weight                   144 lb


 


Weight                         144 lb 8 oz














I&O: 


 











 05/25/20 05/26/20 05/27/20





 06:59 06:59 06:59


 


Intake Total 1440  


 


Balance 1440  











Result Diagrams: 


 05/26/20 05:56





 05/26/20 05:56


Additional Labs: 


 Accuchecks











  05/26/20 05/26/20 05/26/20





  16:16 11:16 05:07


 


POC Glucose  302 H  359 H  413 H














  05/26/20 05/26/20 05/25/20





  03:23 00:32 20:45


 


POC Glucose  393 H  442 H  415 H








Microbiology





05/22/20 10:30   Stool - Loose   Stool Lactoferrin - Final


05/22/20 10:30   Stool - Loose   Rapid Parasite Screen - Final


05/22/20 10:30   Stool - Loose   Campylobacter Antigen Assay - Final


05/22/20 10:30   Stool - Loose   Shiga Toxin Test - Final


05/22/20 10:30   Stool   C. difficile GDH Antigen & Toxins - Final


05/20/20 14:36   Urine voided   Urine Culture - Final


                              Presumptive Ann Krusei


05/22/20 22:21   Venous blood - Right Hand   Blood Culture - Preliminary


                              Specimen has been received and culture in 

progress.


                              No Growth to date.


05/22/20 22:19   Venous blood - Right Arm   Blood Culture - Preliminary


                              Specimen has been received and culture in 

progress.


                              No Growth to date.











Hospitalist ROS





- Medication


Medications: 


Active Medications











Generic Name Dose Route Start Last Admin





  Trade Name Freq  PRN Reason Stop Dose Admin


 


Acetaminophen  650 mg  05/20/20 18:20  05/22/20 20:47





  Tylenol  PO   650 mg





  Q4H PRN   Administration





  Headache/Fever/Mild Pain (1-3)   





     





     





     


 


Apixaban  5 mg  05/26/20 09:00  05/26/20 09:02





  Eliquis  PO   5 mg





  BID MARY   Administration





     





     





     





     


 


Dextrose/Water  25 gm  05/21/20 17:04  05/21/20 17:11





  Dextrose 50%  IVP   25 gm





  PRN PRN   Administration





  HYPOGLYCEMIA PROTOCOL   





     





     





     


 


Ezetimibe  10 mg  05/20/20 21:00  05/25/20 21:37





  Zetia  PO   10 mg





  HS MARY   Administration





     





     





     





     


 


Gabapentin  600 mg  05/20/20 21:00  05/26/20 15:11





  Neurontin  PO   600 mg





  TID MARY   Administration





     





     





     





     


 


Sodium Chloride  1,000 mls @ 100 mls/hr  05/22/20 10:15  05/26/20 15:12





  1/2 Normal Saline  IV   1,000 mls





  .Q10H MARY   Administration





     





     





     





     


 


Micafungin Sodium 100 mg/  100 mls @ 100 mls/hr  05/22/20 17:00  05/26/20 17:17





  Sodium Chloride  IVPB   100 mls





  1700 MARY   Administration





     





     





     





     


 


Insulin Human Regular  0 units  05/21/20 17:04  05/26/20 17:19





  Humulin R  SC   5 unit





  .MILD SLIDING PRN   Administration





  MILD SLIDING SCALE   





     





  Protocol   





     


 


Morphine Sulfate  4 mg  05/25/20 17:55  05/26/20 00:33





  Morphine  SLOW IVP   4 mg





  Q4H PRN   Administration





  Moderate to Severe Pain (6-10)   





     





     





     


 


Venclexta Patient's  0 each  05/21/20 12:00  05/26/20 12:27





  Home Medication  PO   1 each





  1200 MARY   Administration





     





     





     





     


 


Saccharomyces Boulardii  250 mg  05/25/20 09:00  05/26/20 09:02





  Florastor  PO   250 mg





  DAILY MARY   Administration





     





     





     





     


 


Sertraline HCl  100 mg  05/24/20 09:00  05/26/20 09:02





  Zoloft  PO   100 mg





  DAILY MARY   Administration





     





     





     





     


 


Sodium Chloride  10 ml  05/21/20 21:00  05/26/20 09:02





  Flush - Normal Saline  IVF   Not Given





  Q12HR MARY   





     





     





     





     


 


Tizanidine HCl  2 mg  05/21/20 10:27  05/25/20 21:46





  Zanaflex  PO   2 mg





  TID PRN   Administration





  Muscle Spasm   





     





     





     


 


Vancomycin HCl  125 mg  05/23/20 18:00  05/26/20 17:18





  First Vancomycin  PO   125 mg





  Q6HR MARY   Administration





     





     





     





     


 


Voriconazole  200 mg  05/21/20 21:00  05/26/20 09:02





  Vfend  PO   200 mg





  Q12HR MARY   Administration





     





     





     





     














- Exam


General Appearance: NAD, awake alert


Eye: PERRL, anicteric sclera


ENT: normocephalic atraumatic, no oropharyngeal lesions


Neck: supple, symmetric, no JVD, no thyromegaly, no lymphadenopathy


Heart: RRR, no murmur, no gallops, no rubs, normal peripheral pulses


Heart - other findings: S1, S2


Respiratory: CTAB, no wheezes, no rales, no ronchi, normal chest expansion


Gastrointestinal: soft, non-tender, non-distended, normal bowel sounds


Extremities: no cyanosis, no clubbing, no edema


Skin: normal turgor, no lesions


Neurological: cranial nerve grossly intact, no new deficit


Musculoskeletal: normal tone, normal strength, no muscle wasting


Psychiatric: normal affect, A&O x 3





Hosp A/P


(1) Candida UTI


Code(s): B37.49 - OTHER UROGENITAL CANDIDIASIS   Status: Acute   


Plan: 


Continue Voriconazole for outpt RX








(2) Clostridium difficile diarrhea


Code(s): A04.72 - ENTEROCOLITIS D/T CLOSTRIDIUM DIFFICILE, NOT SPCF AS RECUR   

Status: Acute   


Plan: 


Resolving, continue Vancomycin po








(3) Acute kidney injury


Code(s): N17.9 - ACUTE KIDNEY FAILURE, UNSPECIFIED   Status: Acute   


Plan: 


Improved, continue to monitor renal function, s/p bilateral ureteral stent 

exchange








(4) Chronic lymphocytic leukemia (CLL), B-cell


Code(s): C91.10 - CHRONIC LYMPHOCYTIC LEUK OF B-CELL TYPE NOT ACHIEVE REMIS   

Status: Chronic   





- Plan


continue antibiotics, , out of bed/ambulate, DVT proph w/SCDs





Stable overall


Resume Eliquis in am


Continue Voriconazole/Micafungin


OOB/ambulate


Contiue Vancomycin po


Add Florastor 250mg po daily


Likely home in am

## 2020-05-27 VITALS — TEMPERATURE: 98.1 F | DIASTOLIC BLOOD PRESSURE: 64 MMHG | SYSTOLIC BLOOD PRESSURE: 112 MMHG

## 2020-05-27 RX ADMIN — VANCOMYCIN HYDROCHLORIDE SCH MG: KIT at 05:05

## 2020-05-27 RX ADMIN — VANCOMYCIN HYDROCHLORIDE SCH MG: KIT at 00:44

## 2020-05-27 RX ADMIN — Medication SCH: at 07:59

## 2020-05-27 RX ADMIN — INSULIN HUMAN PRN UNIT: 100 INJECTION, SOLUTION PARENTERAL at 05:06

## 2020-05-28 NOTE — DIS
DATE OF ADMISSION:  05/20/2020



DATE OF DISCHARGE:  05/27/2020



DISCHARGE DIAGNOSES:  

1. Urinary tract infection with Candida Krusei species.

2. Clostridium difficile diarrhea, resolving.

3. Acute kidney injury, resolved.

4. Chronic lymphocytic leukemia, B-cell type.

5. Obstructive uropathy status post bilateral ureteral stent exchange, 05/25/2020.



CONSULTATIONS:  

1. Dr. Davison with Urology Service.

2. Dr. Guzman with Infectious Disease Service.



PERTINENT LABORATORY AND X-RAY FINDINGS:  Creatinine ranged between 0.96 to 1.235.

Estimated GFR ranged between 39 to 58.  Lactic acid level 1.3.  CBC showed a white

blood cell count ranged between 2.3 to 3.9, hemoglobin ranged between 8.6 to 10.9.

Urine culture dated 05/20/2020, showed 75 to 100,000 colonies of Ann Krusei.

Clostridium difficile antigen 05/22/2020, antigen positive, toxin negative.  Stool

culture dated 05/22/2020, showed normal enteric richy.  Campylobacter antigen and

Shigella toxin negative, 05/22/2020.  Blood cultures x2 dated 05/22/2020, showed no

growth at 48 hours.  Stool lactoferrin dated 05/22/2020, positive.  CT of the

abdomen and pelvis dated 05/20/2020, showed bilateral ureteral stents in place with

mild bilateral hydronephrosis, slightly greater on the left.  Improvement in

aortocaval and mesenteric lymphadenopathy.  Portable chest x-ray dated 05/22/2020,

showed no acute cardiopulmonary process. 



HOSPITAL COURSE:  The patient was initially admitted to the medical floor after

presenting with dysuria and urinary tract infection confirmed with Candida species.

The patient was placed on voriconazole in addition to micafungin and received

general supportive management including IV fluids.  The patient was evaluated by the

Urology Service, undergoing replacement of bilateral ureteral stents on 05/25/2020.

The patient clinically stabilized with the ureteral stent exchange and overall

remained clinically stable.  Current plans are to continue voriconazole on an

outpatient basis after discharge.  I have examined the patient at the time of

discharge and discussed followup instructions.  The patient verbalized understanding

and agreement, ready for discharge on 05/27/2020. 



DISCHARGE MEDICATIONS:  

1. Voriconazole 200 mg p.o. b.i.d.

2. Zanaflex 2 mg p.o. t.i.d. p.r.n.

3. Eliquis 5 mg p.o. b.i.d.

4. Venclexta 400 mg p.o. daily.

5. Zoloft 100 mg p.o. daily.

6. Crestor 40 mg p.o. at bedtime.

7. Nitroglycerin 0.4 mg sublingually q.5 minutes p.r.n. chest pain.

8. Claritin 10 mg p.o. daily.

9. Glargine insulin.

10. Neurontin 600 mg p.o. t.i.d.

11. Zetia 10 mg p.o. at bedtime.

12. Exenatide 2 mg subcutaneously q.7 days.



FOLLOWUP:  The patient may follow up with her primary care provider, Dr. Momin.  The patient will follow up with Dr. Aly Guzman. 



CONDITION ON DISCHARGE:  Stable.



ACTIVITY:  Ad-domenic.



DIET:  ADA.



CODE STATUS:  Full.



DISPOSITION:  Home on 05/27/2020.



TIME SPENT:  Total time preparing and coordinating discharge, 34 minutes.





Job ID:  [id number]

## 2020-06-12 ENCOUNTER — HOSPITAL ENCOUNTER (OUTPATIENT)
Dept: HOSPITAL 92 - LABBT | Age: 65
Discharge: HOME | End: 2020-06-12
Attending: UROLOGY
Payer: MEDICARE

## 2020-06-12 DIAGNOSIS — Z01.812: Primary | ICD-10-CM

## 2020-06-12 DIAGNOSIS — N13.30: ICD-10-CM

## 2020-06-12 DIAGNOSIS — Z11.59: ICD-10-CM

## 2020-06-12 DIAGNOSIS — N32.89: ICD-10-CM

## 2020-06-12 LAB
ANION GAP SERPL CALC-SCNC: 12 MMOL/L (ref 10–20)
BACTERIA UR QL AUTO: (no result) HPF
BUN SERPL-MCNC: 24 MG/DL (ref 9.8–20.1)
CALCIUM SERPL-MCNC: 7.6 MG/DL (ref 7.8–10.44)
CHLORIDE SERPL-SCNC: 103 MMOL/L (ref 98–107)
CO2 SERPL-SCNC: 26 MMOL/L (ref 23–31)
CREAT CL PREDICTED SERPL C-G-VRATE: 0 ML/MIN (ref 70–130)
GLUCOSE SERPL-MCNC: 202 MG/DL (ref 80–115)
HGB BLD-MCNC: 9.4 G/DL (ref 12–16)
LEUKOCYTE ESTERASE UR QL STRIP.AUTO: 500 LEU/UL
MCH RBC QN AUTO: 28.2 PG (ref 27–31)
MCV RBC AUTO: 89.5 FL (ref 78–98)
PLATELET # BLD AUTO: 330 THOU/UL (ref 130–400)
POTASSIUM SERPL-SCNC: 3.5 MMOL/L (ref 3.5–5.1)
PROT UR STRIP.AUTO-MCNC: 300 MG/DL
RBC # BLD AUTO: 3.33 MILL/UL (ref 4.2–5.4)
SODIUM SERPL-SCNC: 137 MMOL/L (ref 136–145)
WBC # BLD AUTO: 2.5 THOU/UL (ref 4.8–10.8)

## 2020-06-12 PROCEDURE — 85027 COMPLETE CBC AUTOMATED: CPT

## 2020-06-12 PROCEDURE — 87635 SARS-COV-2 COVID-19 AMP PRB: CPT

## 2020-06-12 PROCEDURE — U0003 INFECTIOUS AGENT DETECTION BY NUCLEIC ACID (DNA OR RNA); SEVERE ACUTE RESPIRATORY SYNDROME CORONAVIRUS 2 (SARS-COV-2) (CORONAVIRUS DISEASE [COVID-19]), AMPLIFIED PROBE TECHNIQUE, MAKING USE OF HIGH THROUGHPUT TECHNOLOGIES AS DESCRIBED BY CMS-2020-01-R: HCPCS

## 2020-06-12 PROCEDURE — 80048 BASIC METABOLIC PNL TOTAL CA: CPT

## 2020-06-12 PROCEDURE — 81001 URINALYSIS AUTO W/SCOPE: CPT

## 2020-06-12 PROCEDURE — 87186 SC STD MICRODIL/AGAR DIL: CPT

## 2020-06-12 PROCEDURE — 87077 CULTURE AEROBIC IDENTIFY: CPT

## 2020-06-12 PROCEDURE — 87086 URINE CULTURE/COLONY COUNT: CPT

## 2020-06-14 ENCOUNTER — HOSPITAL ENCOUNTER (INPATIENT)
Dept: HOSPITAL 92 - ERS | Age: 65
LOS: 4 days | DRG: 698 | End: 2020-06-18
Attending: INTERNAL MEDICINE | Admitting: INTERNAL MEDICINE
Payer: MEDICARE

## 2020-06-14 VITALS — BODY MASS INDEX: 25.4 KG/M2

## 2020-06-14 DIAGNOSIS — T83.511A: Primary | ICD-10-CM
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
## 2020-06-14 DIAGNOSIS — T83.112A: ICD-10-CM

## 2020-06-14 DIAGNOSIS — Z66: ICD-10-CM

## 2020-06-14 DIAGNOSIS — Z85.41: ICD-10-CM

## 2020-06-14 DIAGNOSIS — E78.5: ICD-10-CM

## 2020-06-14 DIAGNOSIS — I25.2: ICD-10-CM

## 2020-06-14 DIAGNOSIS — N18.9: ICD-10-CM

## 2020-06-14 DIAGNOSIS — A41.50: ICD-10-CM

## 2020-06-14 DIAGNOSIS — E87.2: ICD-10-CM

## 2020-06-14 DIAGNOSIS — D63.0: ICD-10-CM

## 2020-06-14 DIAGNOSIS — J98.11: ICD-10-CM

## 2020-06-14 DIAGNOSIS — N17.9: ICD-10-CM

## 2020-06-14 DIAGNOSIS — E11.22: ICD-10-CM

## 2020-06-14 DIAGNOSIS — M79.7: ICD-10-CM

## 2020-06-14 DIAGNOSIS — I48.91: ICD-10-CM

## 2020-06-14 DIAGNOSIS — Z88.8: ICD-10-CM

## 2020-06-14 DIAGNOSIS — Z90.710: ICD-10-CM

## 2020-06-14 DIAGNOSIS — Z88.1: ICD-10-CM

## 2020-06-14 DIAGNOSIS — J96.01: ICD-10-CM

## 2020-06-14 DIAGNOSIS — Z51.5: ICD-10-CM

## 2020-06-14 DIAGNOSIS — F41.9: ICD-10-CM

## 2020-06-14 DIAGNOSIS — N30.00: ICD-10-CM

## 2020-06-14 DIAGNOSIS — Z98.51: ICD-10-CM

## 2020-06-14 DIAGNOSIS — E87.1: ICD-10-CM

## 2020-06-14 DIAGNOSIS — C91.10: ICD-10-CM

## 2020-06-14 DIAGNOSIS — I25.10: ICD-10-CM

## 2020-06-14 DIAGNOSIS — R59.0: ICD-10-CM

## 2020-06-14 DIAGNOSIS — B96.4: ICD-10-CM
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
## 2020-06-14 DIAGNOSIS — A04.72: ICD-10-CM

## 2020-06-14 DIAGNOSIS — Z88.0: ICD-10-CM

## 2020-06-14 DIAGNOSIS — Z90.49: ICD-10-CM

## 2020-06-14 DIAGNOSIS — N13.6: ICD-10-CM

## 2020-06-14 DIAGNOSIS — Z87.891: ICD-10-CM

## 2020-06-14 DIAGNOSIS — Z92.21: ICD-10-CM

## 2020-06-14 DIAGNOSIS — F32.9: ICD-10-CM

## 2020-06-14 DIAGNOSIS — Z20.828: ICD-10-CM

## 2020-06-14 DIAGNOSIS — R65.21: ICD-10-CM

## 2020-06-14 LAB
ALBUMIN SERPL BCG-MCNC: 2.6 G/DL (ref 3.4–4.8)
ALP SERPL-CCNC: 1264 U/L (ref 40–110)
ALT SERPL W P-5'-P-CCNC: 8 U/L (ref 8–55)
ANION GAP SERPL CALC-SCNC: 13 MMOL/L (ref 10–20)
AST SERPL-CCNC: 22 U/L (ref 5–34)
BACTERIA UR QL AUTO: (no result) HPF
BILIRUB SERPL-MCNC: 0.8 MG/DL (ref 0.2–1.2)
BUN SERPL-MCNC: 45 MG/DL (ref 9.8–20.1)
CALCIUM SERPL-MCNC: 8 MG/DL (ref 7.8–10.44)
CHLORIDE SERPL-SCNC: 101 MMOL/L (ref 98–107)
CO2 SERPL-SCNC: 21 MMOL/L (ref 23–31)
CREAT CL PREDICTED SERPL C-G-VRATE: 0 ML/MIN (ref 70–130)
GLOBULIN SER CALC-MCNC: 2.6 G/DL (ref 2.4–3.5)
GLUCOSE SERPL-MCNC: 358 MG/DL (ref 80–115)
HGB BLD-MCNC: 8.7 G/DL (ref 12–16)
LEUKOCYTE ESTERASE UR QL STRIP.AUTO: 500 LEU/UL
MCH RBC QN AUTO: 28 PG (ref 27–31)
MCV RBC AUTO: 88.7 FL (ref 78–98)
MDIFF COMPLETE?: YES
OVALOCYTES BLD QL SMEAR: (no result) (100X)
PLATELET # BLD AUTO: 313 THOU/UL (ref 130–400)
POLYCHROMASIA BLD QL SMEAR: (no result) (100X)
POTASSIUM SERPL-SCNC: 4.1 MMOL/L (ref 3.5–5.1)
PROT UR STRIP.AUTO-MCNC: 300 MG/DL
RBC # BLD AUTO: 3.1 MILL/UL (ref 4.2–5.4)
SCHISTOCYTES BLD QL SMEAR: (no result) (100X)
SODIUM SERPL-SCNC: 131 MMOL/L (ref 136–145)
WBC # BLD AUTO: 3 THOU/UL (ref 4.8–10.8)

## 2020-06-14 PROCEDURE — 87040 BLOOD CULTURE FOR BACTERIA: CPT

## 2020-06-14 PROCEDURE — 94640 AIRWAY INHALATION TREATMENT: CPT

## 2020-06-14 PROCEDURE — 86900 BLOOD TYPING SEROLOGIC ABO: CPT

## 2020-06-14 PROCEDURE — 83735 ASSAY OF MAGNESIUM: CPT

## 2020-06-14 PROCEDURE — 71045 X-RAY EXAM CHEST 1 VIEW: CPT

## 2020-06-14 PROCEDURE — 85730 THROMBOPLASTIN TIME PARTIAL: CPT

## 2020-06-14 PROCEDURE — 86850 RBC ANTIBODY SCREEN: CPT

## 2020-06-14 PROCEDURE — 36416 COLLJ CAPILLARY BLOOD SPEC: CPT

## 2020-06-14 PROCEDURE — 36430 TRANSFUSION BLD/BLD COMPNT: CPT

## 2020-06-14 PROCEDURE — 85610 PROTHROMBIN TIME: CPT

## 2020-06-14 PROCEDURE — 93010 ELECTROCARDIOGRAM REPORT: CPT

## 2020-06-14 PROCEDURE — 87077 CULTURE AEROBIC IDENTIFY: CPT

## 2020-06-14 PROCEDURE — 94002 VENT MGMT INPAT INIT DAY: CPT

## 2020-06-14 PROCEDURE — 50432 PLMT NEPHROSTOMY CATHETER: CPT

## 2020-06-14 PROCEDURE — 82533 TOTAL CORTISOL: CPT

## 2020-06-14 PROCEDURE — 76770 US EXAM ABDO BACK WALL COMP: CPT

## 2020-06-14 PROCEDURE — 94760 N-INVAS EAR/PLS OXIMETRY 1: CPT

## 2020-06-14 PROCEDURE — 80053 COMPREHEN METABOLIC PANEL: CPT

## 2020-06-14 PROCEDURE — P9016 RBC LEUKOCYTES REDUCED: HCPCS

## 2020-06-14 PROCEDURE — U0003 INFECTIOUS AGENT DETECTION BY NUCLEIC ACID (DNA OR RNA); SEVERE ACUTE RESPIRATORY SYNDROME CORONAVIRUS 2 (SARS-COV-2) (CORONAVIRUS DISEASE [COVID-19]), AMPLIFIED PROBE TECHNIQUE, MAKING USE OF HIGH THROUGHPUT TECHNOLOGIES AS DESCRIBED BY CMS-2020-01-R: HCPCS

## 2020-06-14 PROCEDURE — 83605 ASSAY OF LACTIC ACID: CPT

## 2020-06-14 PROCEDURE — 96375 TX/PRO/DX INJ NEW DRUG ADDON: CPT

## 2020-06-14 PROCEDURE — 81001 URINALYSIS AUTO W/SCOPE: CPT

## 2020-06-14 PROCEDURE — 84100 ASSAY OF PHOSPHORUS: CPT

## 2020-06-14 PROCEDURE — 85027 COMPLETE CBC AUTOMATED: CPT

## 2020-06-14 PROCEDURE — 93005 ELECTROCARDIOGRAM TRACING: CPT

## 2020-06-14 PROCEDURE — 87149 DNA/RNA DIRECT PROBE: CPT

## 2020-06-14 PROCEDURE — 83690 ASSAY OF LIPASE: CPT

## 2020-06-14 PROCEDURE — 85007 BL SMEAR W/DIFF WBC COUNT: CPT

## 2020-06-14 PROCEDURE — C1729 CATH, DRAINAGE: HCPCS

## 2020-06-14 PROCEDURE — 84484 ASSAY OF TROPONIN QUANT: CPT

## 2020-06-14 PROCEDURE — 86901 BLOOD TYPING SEROLOGIC RH(D): CPT

## 2020-06-14 PROCEDURE — 80048 BASIC METABOLIC PNL TOTAL CA: CPT

## 2020-06-14 PROCEDURE — P9045 ALBUMIN (HUMAN), 5%, 250 ML: HCPCS

## 2020-06-14 PROCEDURE — 81015 MICROSCOPIC EXAM OF URINE: CPT

## 2020-06-14 PROCEDURE — 82805 BLOOD GASES W/O2 SATURATION: CPT

## 2020-06-14 PROCEDURE — 81003 URINALYSIS AUTO W/O SCOPE: CPT

## 2020-06-14 PROCEDURE — 36415 COLL VENOUS BLD VENIPUNCTURE: CPT

## 2020-06-14 PROCEDURE — P9047 ALBUMIN (HUMAN), 25%, 50ML: HCPCS

## 2020-06-14 PROCEDURE — 96365 THER/PROPH/DIAG IV INF INIT: CPT

## 2020-06-14 PROCEDURE — 74176 CT ABD & PELVIS W/O CONTRAST: CPT

## 2020-06-14 PROCEDURE — 87186 SC STD MICRODIL/AGAR DIL: CPT

## 2020-06-14 PROCEDURE — 51701 INSERT BLADDER CATHETER: CPT

## 2020-06-14 PROCEDURE — 50430 NJX PX NFROSGRM &/URTRGRM: CPT

## 2020-06-14 PROCEDURE — 8E0ZXY6 ISOLATION: ICD-10-PCS | Performed by: INTERNAL MEDICINE

## 2020-06-14 PROCEDURE — 85025 COMPLETE CBC W/AUTO DIFF WBC: CPT

## 2020-06-14 PROCEDURE — 94003 VENT MGMT INPAT SUBQ DAY: CPT

## 2020-06-14 PROCEDURE — 96366 THER/PROPH/DIAG IV INF ADDON: CPT

## 2020-06-14 PROCEDURE — 87086 URINE CULTURE/COLONY COUNT: CPT

## 2020-06-14 PROCEDURE — 96368 THER/DIAG CONCURRENT INF: CPT

## 2020-06-14 PROCEDURE — 87635 SARS-COV-2 COVID-19 AMP PRB: CPT

## 2020-06-14 RX ADMIN — VANCOMYCIN HYDROCHLORIDE SCH MG: KIT at 23:45

## 2020-06-14 RX ADMIN — Medication SCH ML: at 20:53

## 2020-06-14 RX ADMIN — ACETAMINOPHEN AND CODEINE PHOSPHATE PRN TAB: 300; 30 TABLET ORAL at 20:37

## 2020-06-14 RX ADMIN — ALBUMIN HUMAN SCH GM: 250 SOLUTION INTRAVENOUS at 20:54

## 2020-06-14 RX ADMIN — Medication SCH ML: at 20:49

## 2020-06-14 NOTE — RAD
Exam: Chest one view



HISTORY:Sepsis.



Comparison: 5/22/2020



FINDINGS: Lines and tubes: Stable left-sided Mediport

Cardiac silhouette: Normal

Aorta: Stable at sclerosis

Pulmonary vessels: Normal

Costophrenic angles: Clear



LUNGS: No masses or consolidation.



Pneumothorax: None



Osseous abnormalities: None



IMPRESSION: No acute cardiopulmonary process.



Reported By: Gene Stubbs 

Electronically Signed:  6/14/2020 2:17 PM

## 2020-06-14 NOTE — CT
CT ABDOMEN NONCONTRAST

CT PELVIS NONCONTRAST:

(Urolithiasis protocol)



DATE:

6/14/2020



HISTORY:

65-year-old female with abdominal pain and fever



COMPARISON:

5/20/2020



TECHNIQUE:

IV injection of iodinated contrast media: None

Oral contrast media: None



FINDINGS:

Other than for urolithiasis, the lack of IV and oral contrast limits the evaluation.



Bilateral ureteral stents remain, with right upper pigtail loop in right renal pelvis and left upper 
pigtail loop in left upper pole calyx, and lower pigtail loops within the urinary bladder.



However, there is worsening of bilateral hydroureteronephrosis, which is now moderate.



Diffuse moderate-severe mural thickening of the urinary bladder is again noted.



Circumferential scar or edema fluid in presacral space and around the issue rectal fossa are again no
jamil.



The ill-defined soft tissue density material in the retroperitoneum, a mild amount surrounding the ab
dominal aorta, and a moderate amount surrounding the common iliac arteries, is largely unchanged.

However, the previously described left para-aortic retroperitoneal lymph node measuring 2 cm at the L
3-4 level is currently impression 1.5 cm, smaller.



The surinder mesentery and soft tissue density material at the root of the mesentery, are again noted. M
isty mesentery appears slightly worse now. No major interval change in splenomegaly. Heavy

atherosclerotic callus patient of abdominal aorta and many of its branches. Within the limitations of
 a noncontrast scan, no gross abnormality of liver, pancreas, or adrenals. Diffuse mild to moderate

anasarca is slightly worse now. No pleural effusion, small bowel dilation, or pneumoperitoneum.



IMPRESSION:

Interval worsening of now moderate right lateral hydroureteronephrosis is evidence for dysfunction of
 bilateral ureteral stents.



Reported By: Kenan Snow 

Electronically Signed:  6/14/2020 4:24 PM

## 2020-06-15 LAB
ALBUMIN SERPL BCG-MCNC: 2.5 G/DL (ref 3.4–4.8)
ALP SERPL-CCNC: 1206 U/L (ref 40–110)
ALT SERPL W P-5'-P-CCNC: (no result) U/L (ref 8–55)
ANALYZER IN CARDIO: (no result)
ANION GAP SERPL CALC-SCNC: 10 MMOL/L (ref 10–20)
ANION GAP SERPL CALC-SCNC: 11 MMOL/L (ref 10–20)
AST SERPL-CCNC: 20 U/L (ref 5–34)
BASE EXCESS STD BLDA CALC-SCNC: -10 MEQ/L
BILIRUB SERPL-MCNC: 0.6 MG/DL (ref 0.2–1.2)
BUN SERPL-MCNC: 49 MG/DL (ref 9.8–20.1)
BUN SERPL-MCNC: 55 MG/DL (ref 9.8–20.1)
CA-I BLDA-SCNC: 1.16 MMOL/L (ref 1.12–1.3)
CALCIUM SERPL-MCNC: 7.6 MG/DL (ref 7.8–10.44)
CALCIUM SERPL-MCNC: 7.6 MG/DL (ref 7.8–10.44)
CHLORIDE SERPL-SCNC: 104 MMOL/L (ref 98–107)
CHLORIDE SERPL-SCNC: 106 MMOL/L (ref 98–107)
CO2 SERPL-SCNC: 20 MMOL/L (ref 23–31)
CO2 SERPL-SCNC: 23 MMOL/L (ref 23–31)
CREAT CL PREDICTED SERPL C-G-VRATE: 21 ML/MIN (ref 70–130)
CREAT CL PREDICTED SERPL C-G-VRATE: 27 ML/MIN (ref 70–130)
GLOBULIN SER CALC-MCNC: 2.3 G/DL (ref 2.4–3.5)
GLUCOSE SERPL-MCNC: 208 MG/DL (ref 80–115)
GLUCOSE SERPL-MCNC: 270 MG/DL (ref 80–115)
HCO3 BLDA-SCNC: 17.9 MEQ/L (ref 22–28)
HCT VFR BLDA CALC: 27 % (ref 36–47)
HGB BLD-MCNC: 6.8 G/DL (ref 12–16)
HGB BLD-MCNC: 7.9 G/DL (ref 12–16)
HGB BLDA-MCNC: 9.3 G/DL (ref 12–16)
MAGNESIUM SERPL-MCNC: 2.1 MG/DL (ref 1.6–2.6)
MCH RBC QN AUTO: 26.8 PG (ref 27–31)
MCH RBC QN AUTO: 28 PG (ref 27–31)
MCV RBC AUTO: 88.5 FL (ref 78–98)
MCV RBC AUTO: 89.6 FL (ref 78–98)
MDIFF COMPLETE?: YES
MDIFF COMPLETE?: YES
O2 A-A PPRESDIFF RESPIRATORY: 82.19 MM[HG] (ref 0–20)
PCO2 BLDA: 49.5 MMHG (ref 35–45)
PH BLDA: 7.18 [PH] (ref 7.35–7.45)
PLATELET # BLD AUTO: 246 THOU/UL (ref 130–400)
PLATELET # BLD AUTO: 287 THOU/UL (ref 130–400)
PO2 BLDA: 84.1 MMHG (ref 80–?)
POTASSIUM BLD-SCNC: 3.94 MMOL/L (ref 3.7–5.3)
POTASSIUM SERPL-SCNC: 3.9 MMOL/L (ref 3.5–5.1)
POTASSIUM SERPL-SCNC: 4.1 MMOL/L (ref 3.5–5.1)
RBC # BLD AUTO: 2.42 MILL/UL (ref 4.2–5.4)
RBC # BLD AUTO: 2.96 MILL/UL (ref 4.2–5.4)
SODIUM SERPL-SCNC: 133 MMOL/L (ref 136–145)
SODIUM SERPL-SCNC: 133 MMOL/L (ref 136–145)
SPECIMEN DRAWN FROM PATIENT: (no result)
TROPONIN I SERPL DL<=0.01 NG/ML-MCNC: 0.06 NG/ML (ref ?–0.03)
WBC # BLD AUTO: 2.5 THOU/UL (ref 4.8–10.8)
WBC # BLD AUTO: 3.5 THOU/UL (ref 4.8–10.8)

## 2020-06-15 RX ADMIN — VANCOMYCIN HYDROCHLORIDE SCH MG: KIT at 20:03

## 2020-06-15 RX ADMIN — ALBUMIN HUMAN SCH GM: 250 SOLUTION INTRAVENOUS at 22:02

## 2020-06-15 RX ADMIN — Medication SCH: at 20:16

## 2020-06-15 RX ADMIN — ACETAMINOPHEN AND CODEINE PHOSPHATE PRN TAB: 300; 30 TABLET ORAL at 00:38

## 2020-06-15 RX ADMIN — VANCOMYCIN HYDROCHLORIDE SCH MG: KIT at 12:24

## 2020-06-15 RX ADMIN — ALBUMIN HUMAN SCH GM: 250 SOLUTION INTRAVENOUS at 05:57

## 2020-06-15 RX ADMIN — INSULIN GLARGINE SCH: 100 INJECTION, SOLUTION SUBCUTANEOUS at 20:16

## 2020-06-15 RX ADMIN — VANCOMYCIN HYDROCHLORIDE SCH MG: KIT at 05:58

## 2020-06-15 RX ADMIN — ALBUMIN HUMAN SCH GM: 250 SOLUTION INTRAVENOUS at 15:17

## 2020-06-15 RX ADMIN — ACETAMINOPHEN AND CODEINE PHOSPHATE PRN TAB: 300; 30 TABLET ORAL at 04:18

## 2020-06-15 NOTE — CON
DATE OF CONSULTATION:  



REASON FOR CONSULT:  CLL.



HISTORY OF PRESENT ILLNESS:  Ms. Carrell is a 65-year-old female, who has stage 
I

CLL.  She is CD38 positive.  She has been on Gazyva and venetoclax.  In 
November of

last year, she had extensive retroperitoneal lymphadenopathy causing bilateral

hydronephrosis and had ureteral stent placement in January.  She has been 
dealing

with Enterococcus septicemia and Clostridium difficile since February.  She has 
only

completed three cycles of chemotherapy as most cycles have been postponed due to

infection.  In fact, she was seen in our office on June 10th by Dr. Silva and 
was

noted to have mild neutropenia with ANC of 1.4.  Chemo was once again postponed.

She is followed by Dr. Guzman for her urinary infections and Clostridium 
difficile.

She was to have her stents removed tomorrow by Dr. Bahena.  She had a COVID 
test

on June 11th that was negative.  Over the weekend, she once again developed 
fever,

weakness, abdominal pain, nausea, and vomiting.  She presented to the emergency 
room

and was placed on COVID precautions.  Her test returned today negative.  On

admission, she was placed on aztreonam and vancomycin.  Her Eliquis was held.  
She

has been getting IV hydration.  Her white count on admission was 3.  Her 
hemoglobin

has trended downward to 7.9.  The patient was seen at bedside.  She appears 
acutely

ill.  She remains on Clostridium difficile precautions.  She denies any 
complaints

other than occasional abdominal pain. 



PAST MEDICAL HISTORY:  

1. CLL.

2. Enterococcus septicemia.

3. Chronic Clostridium difficile.

4. Chronic fungal UTI.

5. Diabetes.

6. MI.

7. Hyperlipidemia.

8. Anxiety and depression.

9. Remote history of cervical cancer.



PAST SURGICAL HISTORY:  

1. Appendectomy.

2. Ear and eye surgery.

3. Hysterectomy.

4. Tonsillectomy.

5. Back surgery.



ALLERGIES:  TO ASPIRIN, BACTRIM, KEFLEX, AND PENICILLINS.



HOME MEDICATIONS:  

1. Atenolol.

2. Zetia.

3. Neurontin.

4. Crestor.

5. Zoloft.

6. Eliquis.

7. Zanaflex.

8. Vfend.

9. Exenatide injection.



FAMILY HISTORY:  Father had lung cancer.  Has brothers with kidney and bone 
cancer.



SOCIAL HISTORY:  , has 4 children.  Former smoker.  No alcohol or 
illicit

drug use. 



REVIEW OF SYSTEMS:  Negative except for noted in HPI.



PHYSICAL EXAMINATION:

VITAL SIGNS:  Temperature is 97.8 with T-max of 102.8 in the last 24 hours, 
pulse is

80, respiratory rate 18, BP is 133/70, and she is 99% on room air. 

GENERAL:  This is a well-developed, well-nourished female, in mild distress. 

HEENT:  Normocephalic and atraumatic.  Pupils are equal and reactive to light. 

NECK:  Supple. 

CV:  Regular rate and rhythm. 

LUNGS:  Clear anterior. 

ABDOMEN:  Soft, mildly tender to palpation.  Bowel sounds are positive. 

EXTREMITIES:  No clubbing or cyanosis. 

SKIN:  No rash. 

NEUROLOGIC:  Nonfocal.



PERTINENT LABORATORY DATA AND X-RAYS:  Current WBCs are 3.5, hemoglobin 7.9,

hematocrit 26.2, platelet count 287,000, she has 35% neutrophils, 5% bands, 33%

lymphocytes, 27% monocytes.  Sodium is 133, potassium 4.1, chloride 104, CO2 is 
23,

BUN is 49, creatinine 2.18, lactic acid 2.1, calcium 7.65, phosphorus 4, 
magnesium

2.1, total bilirubin 0.6, AST is 20, ALT is less than 7, alkaline phosphatase is

1206.  Serum total protein is 4.8, albumin 2.5, globulin 2.3.  Chest x-ray 
showed no

acute process.  Abdomen and pelvis CT showed moderate right lateral 
hydronephrosis

and stents, showed improvement in lymphadenopathy. 



ASSESSMENT:  

1. Sepsis.

2. Chronic lymphocytic leukemia.

3. Negative COVID x2.

4. Acute kidney injury.



DISCUSSION:  The patient has been placed on antibiotics.  She has gram-negative 
rods

in her blood culture.  Recommend consulting ID as Dr. Guzman is managing the 
patient

in the outpatient setting, Dr. Bahena has been consulted.  Her chemotherapy 
is

currently held.  We will monitor her CBC and transfuse as needed. 



Thank you for the consult.  Case has been discussed with Dr. Silva.







Job ID:  [id number]



[facility]

## 2020-06-15 NOTE — CON
DATE OF CONSULTATION:  06/15/2020



HISTORY OF PRESENT ILLNESS:  This is a 65-year-old female with a history of CLL,

which was causing ureteral obstruction from retroperitoneal lymphadenopathy.  She

underwent bilateral stent placement on January 28, 2020.  She was started on

treatment for the CLL by Dr. Silva and has had recurrent urinary tract infections

requiring hospital admission.  She recently underwent stent exchange during

hospitalization.  She presented to the emergency room yesterday reporting several

days of abdominal pain, cramping, nausea, vomiting, weakness, and fevers.  She also

noted dysuria and hematuria.  She was scheduled to have her stents removed tomorrow.

 CT scan from her ER visit yesterday shows mild left hydroureter and hydronephrosis

with mild-to-moderate hydroureter and hydronephrosis on the right.  Creatinine was

2.3.  She met sepsis protocol, and so was admitted to the medical service, started

on aztreonam and vancomycin. 



In speaking with her today, she tells me that she does not feel that she has

improved much since yesterday.  She continues to have flank pain, nausea, low-grade

fevers, dysuria, intermittent hematuria.  Her creatinine has improved somewhat to

2.1. 



PAST MEDICAL HISTORY:  CLL treated with chemotherapy by Dr. Silva, cervical cancer,

bilateral ureteral obstruction with hydronephrosis, coronary artery disease,

hyperlipidemia, diabetes, fibromyalgia, migraines, and depression. 



PAST SURGICAL HISTORY:  Cystocele repair, stent placement as described in HPI, tubal

ligation, appendectomy, lumpectomy, back surgery, and hysterectomy. 



SOCIAL HISTORY:  Nonsmoker.  No substance abuse.



FAMILY HISTORY:  Reviewed, noncontributory.



MEDICATIONS:  Reviewed.  No pertinent urology medications at this time.



REVIEW OF SYSTEMS:  Ten-point review of systems negative except as mentioned in my

HPI. 



PHYSICAL EXAMINATION:

VITAL SIGNS:  Febrile to 102.8 overnight, mild tachycardia.  Blood pressure stable

overnight.  Urine output not recorded overnight. 

GENERAL:  No acute distress, we will converse when prompted. 

HEAD:  Normocephalic and atraumatic.  Extraocular movements intact.  Sclerae

nonicteric. 

NECK:  Supple.  Trachea midline. 

LUNGS:  Breathing unlabored.  Symmetric chest expansion. 

HEART:  Regular rate and rhythm. 

ABDOMEN:  Soft, diffusely tender, and nondistended.  Mild suprapubic tenderness.  No

CVA tenderness. 

SKIN:  Warm and dry. 

:  Patterson catheter draining yellow urine without debris. 

EXTREMITIES:  Without clubbing or cyanosis. 

NEUROLOGIC:  Somnolent, oriented x3. 

PSYCHIATRIC:  Normal mood and affect.



LABORATORY DATA:  Reviewed.  White count 3.5, creatinine 2.18.  Urinalysis; negative

nitrite, positive leukocyte. 



Urine culture from last week growing Proteus resistant only to Macrobid. 



CT scan has been personally reviewed by me showing persistent hydronephrosis and

hydroureter down to about the level of the internal iliacs bilaterally right greater

than left. 



ASSESSMENT AND PLAN:  Hydronephrosis, urinary tract infection, ureteral obstruction. 



I agree with broad-spectrum antibiotics while we await resistances. 



I have asked that a Patterson catheter replaced yesterday in emergency room to see how

her creatinine and hydronephrosis responded.  This was not done and so I asked that

to be placed this morning.  We will repeat a renal ultrasound this afternoon.  If

her 

hydronephrosis does not improve with Patterson catheter drainage, she will likely

require percutaneous nephrostomy tubes.  Afterwards, we can remove her stents. 



TIME SPENT:  70 minutes spent in the patient's care.







Job ID:  [id number]

## 2020-06-15 NOTE — ULT
Bilateral renal ultrasound



CLINICAL INDICATION:

Hydronephrosis, acute renal insufficiency.



COMPARISON:

CT abdomen and pelvis on 6/14/2020



FINDINGS:



Right kidney: Right kidney is not well seen due to shadowing, but there is evidence of mild to modera
te right hydronephrosis. No right renal mass is seen.The right kidney measures 12 cm x 6 cm.



Left kidney: Mild left hydronephrosis. There is no renal cortical thinning or obvious left renal mass
 seen.The left kidney measures 10.3 cm x 6 cm.



Urinary bladder: Patterson catheter in place, and the urinary bladder is completely decompressed.



IMPRESSION:

Bilateral hydronephrosis which was also seen on recent CT abdomen.



Reported By: Tyler Sands 

Electronically Signed:  6/15/2020 7:38 PM

## 2020-06-15 NOTE — PDOC.EVN
Event Note





- Event Note


Event Note: 





Patient seen and examined for gen weakness with fever. Chart/labs reviewed. 

Previous records reviewed. Ill appearing. Lungs - dec AE at bases. Heart S1S2+. 

Imaging reviewed. Started Azactam for Sepsis. Dr Durham updated by ER 

physician. COVID pending. Will keep NPO past MN. I agree with the note, A and P 

by KAPIL Desir.





 Selected Entries











  06/14/20





  20:15


 


Temperature 99.8 F H


 


Pulse Rate 106 H


 


Blood Pressure 132/68





[Semi-Fowlers] 


 


Respiratory 32 H





Rate 


 


O2 Sat by Pulse 100





Oximetry 


 


Oxygen Delivery Room Air





Method

## 2020-06-15 NOTE — CON
DATE OF CONSULTATION:  06/15/2020



REASON FOR CONSULTATION:  Urinary tract infection, stents, and bacteremia.



HISTORY OF PRESENT ILLNESS:  A 65-year-old well known to me from prior visits,

history of CLL treated with anti-CD20 monoclonal antibody.  She had obstructive

uropathy as well and had bilateral stents, has been started on chemotherapy by 
Dr. Silva.  She has had some complications related to urinary tract infections 
including

E. faecalis and urosepsis.  She was treated with vancomycin.  She developed C. 
diff

colitis, was given oral vancomycin and then, she had recurrence of dysuria and 
at

this time, yeast was identified and this has a Candida krusei was identified, 
so she

was switched to voriconazole and she responded well to treatment, but it took 
quite

a few days.  We had to treat her with micafungin in addition to voriconazole, 
but

eventually she responded and it looks like she was scheduled to have the stent

removed tomorrow, but then she unfortunately had to be admitted with sepsis.  
She

came through the emergency room with fever, weakness, abdominal pain, and 
initial

findings included /53, pulse 95, temperature 102.7, and O2 saturation 95 
and

she was initially found to be in moderate distress with pain in the abdominal 
area

and this was diffusely tender.  There is moderate intensity with some 
distention.

Lungs are clear.  Heart exam showed S1 and S2 with no murmurs.  Other findings 
on

admission are the white cell count 3.0, hemoglobin 8.7, and platelets 313 with 
23%

neutrophils and 9% bands.  Sodium was 131 and creatinine 2.30, baseline is 0.99.

The alkaline phosphatase was 1264 and albumin 2.6.  Troponin less than 0.01.

Urinalysis greater than 50 wbc's and rbc's and the COVID test was negative and 
now

we have a gram-negative katherin isolated in the blood, one out of two sets, likely 
the

Proteus mirabilis retrieved from the urine.  Just a few days ago, Proteus 
mirabilis

was isolated from the urine culture and very likely to be the same organism, 
which

has a broad susceptibility profile.  Currently, Ms. Carrell is somewhat

apprehensive.  She does not appear in acute distress.  No headaches.  No visual

symptoms, sore throat, odynophagia, or dysphagia.  A little bit nausea, but no

vomiting.  A little bit of cough, but no dyspnea or chest pain.  Moderate 
abdominal

pain, which is diffuse.  She has a Patterson catheter inserted and she has no focal

neurological symptoms. 



PAST MEDICAL HISTORY:  CLL, non-Hodgkin's lymphoma, obstructive uropathy due to

retroperitoneal lymphadenopathy bypassed with stents bilaterally, episodes of

invasive UTI with various organisms including E. faecalis as well as Candida 
krusei,

type 2 diabetes, fibromyalgia, hypertension, cervical cancer. 



PAST SURGICAL HISTORY:  Includes lumpectomy, bladder lift, appendectomy, and 
stent

placement. 



SOCIAL HISTORY:  Former smoker.  She used to work as a cook.  Disabled with her

illness now. 



FAMILY HISTORY:  Noncontributory.



ALLERGIES:  CEPHALEXIN, PENICILLIN, ASPIRIN, AND DIFLUCAN WITH A RASH.



CURRENT MEDICATIONS:  Include;

1. Tylenol.

2. Albumin.

3. Tenormin.

4. Aztreonam.

5. Tums.

6. Zetia.

7. Pepcid.

8. Neurontin.

9. Insulin.

10. Claritin.

11. Zofran.

12. Crestor.

13. Florastor.

14. Zoloft.

15. Ultram.

16. Oral vancomycin.



PHYSICAL EXAMINATION:

VITAL SIGNS:  T-max 102.8 and now she is 97.2, blood pressure 95/52, pulse 83,

respirations 20, and O2 saturation 99. 

GENERAL:  Appears chronically ill, but in no acute distress. 

HEENT:  Ocular movements conjugate.  Somewhat pale conjunctivae.  Oral cavity 
not

remarkable. 

NECK:  Supple.  No jugular vein distention. 

LUNGS:  Symmetric clear breath sounds. 

HEART:  S1 and S2.  Regular rate with a soft aortic murmur. 

ABDOMEN:  Mildly distended and diffusely tender, but mild-to-moderately tender.
  No

ascites noted.  No hepatosplenomegaly.  No bladder distention.  Patterson catheter 
in

place with cloudy urine in bag. 

EXTREMITIES:  She is able to move extremities.  No edema.  Pulses 1+ in dorsalis

pedis.  Plantar responses are flexor.  No clonus. 

NEUROLOGIC:  She is awake.  Follows commands.  Speech is normal.  Recollection 
is

preserved. 



LABORATORY STUDIES:  Followup white cell count 3.5, hemoglobin 7.9, and 
platelets

287. 



IMAGING:  We have abdomen and pelvis CT and this shows worsening of moderate 
right

lateral hydroureteronephrosis, which is evidence of dysfunction of stents. 



ASSESSMENT:  

1. Chronic lymphocytic leukemia/lymphoma, status post anti-CD20 monoclonal 
antibody

treatment with improvement. 

2. Stents with recurrence of invasive urinary tract infection with definite 
evidence

of obstructive features, probably the stents are plugged up, particularly the 
one on

the right side. 



DISCUSSION:  The organism is quite susceptible to various antimicrobials and I 
think

aztreonam should be effective.  Eventually, she could be transitioned to

trimethoprim/sulfamethoxazole or ampicillin to hopefully transition to oral 
therapy

for discharge planning. In view of the worsening hydronephrosis, one would have 
to verify

the patency of the outflow tract after the stents are removed.  She has a 
little bit of crackling in the left

lung probably resulting from the bacteremia. 







Job ID:  [id number]



[facility]

## 2020-06-15 NOTE — HP
PRIMARY CARE PHYSICIAN:  Vladimir at the [facility].



UROLOGIST:  Garry Bahena MD



ONCOLOGIST:  Donn Silva MD



CHIEF COMPLAINT:  General weakness, fever, abdominal pain, nausea, vomiting.



HISTORY OF PRESENT ILLNESS:  The patient is a 65-year-old female with past 
medical

history significant for recurrent UTIs with bilateral stents in place, CLL 
currently

on chemo treated by Dr. Silva, cervical cancer, diabetes 2, coronary artery 
disease,

who presents to the ER with the above complaint.  The patient reports over the 
last

several days she has developed some abdominal pain and cramping with associated

nausea, vomiting, diarrhea and generalized weakness.  She reports having a 
fever as

well.  She reports a nonproductive cough.  She denies any shortness of breath or

chest pain.  She denies any headache or neck stiffness or any focal deficits.  
She

denies any blood or mucus in the stools.  She is scheduled to have her bilateral

stents removed next week, so she has been swabbed for COVID, that was done 
Friday

and that report is still pending.  The patient was also recently discharged 
from the

hospital on 05/27.  The final diagnosis was UTI with Candida Krusei species, C.

diff, and MULUGETA.  She was discharged home on oral antifungals and oral vancomycin.

She reports that she completed the oral antifungals on Thursday and she reports 
that

she is currently taking the vancomycin orally and diarrhea has improved.  Given 
her

symptoms, her family members recommended that she come into the ER today.  In 
the

ER, the present presented febrile with a temperature of 102.7.  She was 
tachycardic

with a heart rate 95, and she was tachypneic with a respiratory rate of 34, and 
her

pain scale was 10/10.  EKG was sinus rhythm.  Her troponins are negative.  Her 
chest

x-ray was negative.  CT of the abdomen was positive for moderate right lateral

hydronephrosis.  Her lactic acid was 2.3.  Her WBCs were 3.0.  Her urine was 
turbid,

had 500 leukocytes, wbc's greater than 50, and bacteria 4+.  The patient's 
sodium

was 131.  She had a glucose of 358.  Her BUN was 45 and creatinine was 2.30.  
Her

LFTs were unremarkable.  She was given aztreonam and vancomycin IV piggyback, 1 
L of

normal saline and some fentanyl with some symptom improvement. 



PAST MEDICAL HISTORY:  

1. Recurrent UTIs with bilateral stent placement in March.

2. CLL completed chemotherapy on Wednesday, followed by Dr. Silva.

3. Cervical cancer.

4. CAD.

5. MI 2010.

6. Hyperlipidemia.

7. Diabetes 2 on insulin.

8. Fibromyalgia.

9. Migraines.

10. Anxiety and depression.



SURGICAL HISTORY:  

1. Bladder lift.

2. Bilateral ureteral stents.

3. Tubal ligation.

4. Appendectomy.

5. Hysterectomy.

6. Tonsils.

7. Back surgery x2.

8. Right breast lumpectomy.



SOCIAL HISTORY:  The patient lives alone.  Has home health aide come to her 
home.

She has no history of smoking, illicit drug use or alcohol intake. 



FAMILY HISTORY:  Contributory for cancer. Contributory for cardiac disease.



REVIEW OF SYSTEMS:  All other review of systems are negative unless otherwise 
noted

in the HPI. 



PHYSICAL EXAMINATION:

VITAL SIGNS:  Temperature 102.7, blood pressure 114/53, heart rate 95, 
respirations

34, SpO2 of 95% on room air.  Pain scale 10/10. 

CONSTITUTIONAL: The patient was febrile, uncomfortable, and toxic in appearance.

She was alert and oriented to person, place, and time. 

HEAD:  Atraumatic, normocephalic. 

EYES:  Extraocular muscles intact.  PERRLA.  Sclerae nonicteric. 

ENT: Pharynx was normal.  Uvula was midline.  Tonsils were normal.  Mucous 
membranes

are moist.  She had multiple dental caries and dry mucous membranes. 

NECK:  Full range of motion.  Trachea midline.  No meningeal signs.  No cervical

adenopathy. 

RESPIRATORY: Respirations upon exam were even and unlabored.  Breath sounds were

clear throughout.  No rhonchi, wheezes or rales. 

CARDIOVASCULAR:  Regular rate and rhythm.  Normal heart sounds.  No murmurs, 
rubs,

or gallops. 

ABDOMEN:  Abdomen was tender diffusely, moderate intensity.  Mild distention

present.  Active bowel sounds.  No peritoneal signs.  No rigidity.  No guarding 
or

rebound.   Rovsing sign was negative. 

BACK: Full range of motion.  No central spinous tenderness.  No CVA tenderness. 

EXTREMITIES:  Upper extremities, strength normal.  Full range of motion.  
Palpable

radial pulses.  Lower extremities, full range of motion.  Normal strength.  2+

pitting edema.  Palpable pedal pulses. 

NEUROLOGIC:  Alert and oriented to person, place, and time.  Normal gait. 
Cranial

nerves 2 through 12 intact.  No focal motor deficits. 



LABORATORIES AND DIAGNOSTICS:  EKG, sinus rhythm 91.  Troponin negative.  Chest

x-ray negative for any acute process.  CT of the abdomen was positive for 
moderate

right lateral hydronephrosis.  Lactic acid was 2.3, repeat was 2.1.  Sodium was 
131,

potassium 4.1, chloride 101, CO2 of 21, BUN 45, creatinine 2.30, glucose of 358.

WBCs 3.0, hemoglobin 8.7, hematocrit 24.5, platelets 313.  Albumin was 2.6, 
lipase

was 13, bilirubin 0.8, alkaline phosphatase 1264, AST 22, ALT 8.  Urine was 
turbid,

leukocytes were 500, wbc's greater than 50, and bacteria 4+. 



IMPRESSION AND PLAN:  

1. Acute cystitis.  We will admit the patient to the telemetry floor inpatient

status.  Expected length of stay greater than two midnights.  The patient 
presented

febrile, tachycardic and tachypneic.  CT of the abdomen was positive for right

hydronephrosis.  WBCs were 3, lactic acid was 2.3.  Urine was consistent with

urinary tract infection.  The patient was given aztreonam and vancomycin in the 
ER.

We will continue aztreonam and vancomycin IV piggyback.  Dr. Bahena was 
consulted

in the ER and is going to see the patient tomorrow.  We will make the patient 
n.p.o.

after midnight.  The patient has been off Eliquis since Thursday.  We will 
continue

IV fluid hydration gently. 

2. Sepsis, likely related to #1.

3. Suspected COVID.  The patient was swabbed on Friday.  Results are pending.  
She

was planning preop surgery for removal of her bilateral stents next week.  We 
will reswab and 

place the patient on droplet precautions and await results. 

4. Hyponatremia.  The patient presented with sodium of 131, corrected for 
glucose of

358.  The sodium was 135. 

5. Acute kidney injury.  The patient presented with a creatinine of 2.3, 
baseline is

1.0.  She will receive 1 L of fluid in the ER.  We will continue light gentle IV

hydration with normal saline. 

6. Metabolic acidosis.

7. Diabetes 2.  The patient reports taking Toujeo 70 units at night and she is 
also

on NovoLog 15 units with meals and Bydureon 2 mg subcu once a week.  She has not

been taking her insulin according to the patient and her family members.  The

patient presented with a blood glucose of 358.  We will give 10 units of Lantus 
x1

moderate sliding scale with Accu-Cheks q.6.  The patient is n.p.o. after 
midnight. 

8. Chronic lymphocytic leukemia.

9. Frequent urinary tract infections.

10. No SCDs or pharmaco deep venous thrombosis prophylaxis.  We will give 
Pepcid for

GI prophylaxis.  The patient is a full code.  Her MPOA is Camilla Betancourt, number 
is

[phone]. 

11. Discussed the case with Dr. Marks.







Job ID:  [id number]



MTDD

## 2020-06-16 LAB
ALBUMIN SERPL BCG-MCNC: 3 G/DL (ref 3.4–4.8)
ALP SERPL-CCNC: 1074 U/L (ref 40–110)
ALT SERPL W P-5'-P-CCNC: 7 U/L (ref 8–55)
ANALYZER IN CARDIO: (no result)
ANALYZER IN CARDIO: (no result)
ANION GAP SERPL CALC-SCNC: 13 MMOL/L (ref 10–20)
ANION GAP SERPL CALC-SCNC: 17 MMOL/L (ref 10–20)
APTT PPP: 48.2 SEC (ref 22.9–36.1)
AST SERPL-CCNC: 33 U/L (ref 5–34)
BASE EXCESS STD BLDA CALC-SCNC: -6.5 MEQ/L
BASE EXCESS STD BLDA CALC-SCNC: -6.5 MEQ/L
BILIRUB SERPL-MCNC: 1.5 MG/DL (ref 0.2–1.2)
BUN SERPL-MCNC: 57 MG/DL (ref 9.8–20.1)
BUN SERPL-MCNC: 58 MG/DL (ref 9.8–20.1)
CA-I BLDA-SCNC: 1.04 MMOL/L (ref 1.12–1.3)
CA-I BLDA-SCNC: 1.12 MMOL/L (ref 1.12–1.3)
CALCIUM SERPL-MCNC: 7.3 MG/DL (ref 7.8–10.44)
CALCIUM SERPL-MCNC: 7.5 MG/DL (ref 7.8–10.44)
CHLORIDE SERPL-SCNC: 104 MMOL/L (ref 98–107)
CHLORIDE SERPL-SCNC: 104 MMOL/L (ref 98–107)
CO2 SERPL-SCNC: 19 MMOL/L (ref 23–31)
CO2 SERPL-SCNC: 23 MMOL/L (ref 23–31)
CREAT CL PREDICTED SERPL C-G-VRATE: 18 ML/MIN (ref 70–130)
CREAT CL PREDICTED SERPL C-G-VRATE: 20 ML/MIN (ref 70–130)
GLOBULIN SER CALC-MCNC: 1.9 G/DL (ref 2.4–3.5)
GLUCOSE SERPL-MCNC: 115 MG/DL (ref 80–115)
GLUCOSE SERPL-MCNC: 225 MG/DL (ref 80–115)
HCO3 BLDA-SCNC: 17.4 MEQ/L (ref 22–28)
HCO3 BLDA-SCNC: 21.4 MEQ/L (ref 22–28)
HCT VFR BLDA CALC: 23 % (ref 36–47)
HCT VFR BLDA CALC: 24 % (ref 36–47)
HGB BLD-MCNC: 7.9 G/DL (ref 12–16)
HGB BLD-MCNC: 8.1 G/DL (ref 12–16)
HGB BLDA-MCNC: 7.7 G/DL (ref 12–16)
HGB BLDA-MCNC: 8 G/DL (ref 12–16)
INR PPP: 1.4
MAGNESIUM SERPL-MCNC: 1.8 MG/DL (ref 1.6–2.6)
MAGNESIUM SERPL-MCNC: 1.9 MG/DL (ref 1.6–2.6)
MCH RBC QN AUTO: 27.1 PG (ref 27–31)
MCV RBC AUTO: 88 FL (ref 78–98)
MDIFF COMPLETE?: YES
PCO2 BLDA: 28.6 MMHG (ref 35–45)
PCO2 BLDA: 57.4 MMHG (ref 35–45)
PH BLDA: 7.19 [PH] (ref 7.35–7.45)
PH BLDA: 7.4 [PH] (ref 7.35–7.45)
PLATELET # BLD AUTO: 243 THOU/UL (ref 130–400)
PO2 BLDA: 273.7 MMHG (ref 80–?)
PO2 BLDA: 84 MMHG (ref 80–?)
POTASSIUM BLD-SCNC: 3.96 MMOL/L (ref 3.7–5.3)
POTASSIUM BLD-SCNC: 4 MMOL/L (ref 3.7–5.3)
POTASSIUM SERPL-SCNC: 3.3 MMOL/L (ref 3.5–5.1)
POTASSIUM SERPL-SCNC: 3.5 MMOL/L (ref 3.5–5.1)
PROTHROMBIN TIME: 16.6 SEC (ref 12–14.7)
RBC # BLD AUTO: 2.9 MILL/UL (ref 4.2–5.4)
SODIUM SERPL-SCNC: 136 MMOL/L (ref 136–145)
SODIUM SERPL-SCNC: 137 MMOL/L (ref 136–145)
SPECIMEN DRAWN FROM PATIENT: (no result)
SPECIMEN DRAWN FROM PATIENT: (no result)
TROPONIN I SERPL DL<=0.01 NG/ML-MCNC: 0.05 NG/ML (ref ?–0.03)
WBC # BLD AUTO: 2.2 THOU/UL (ref 4.8–10.8)

## 2020-06-16 PROCEDURE — 0T9330Z DRAINAGE OF RIGHT KIDNEY PELVIS WITH DRAINAGE DEVICE, PERCUTANEOUS APPROACH: ICD-10-PCS | Performed by: RADIOLOGY

## 2020-06-16 PROCEDURE — 3E033XZ INTRODUCTION OF VASOPRESSOR INTO PERIPHERAL VEIN, PERCUTANEOUS APPROACH: ICD-10-PCS | Performed by: INTERNAL MEDICINE

## 2020-06-16 PROCEDURE — 0T9430Z DRAINAGE OF LEFT KIDNEY PELVIS WITH DRAINAGE DEVICE, PERCUTANEOUS APPROACH: ICD-10-PCS | Performed by: RADIOLOGY

## 2020-06-16 PROCEDURE — 5A1945Z RESPIRATORY VENTILATION, 24-96 CONSECUTIVE HOURS: ICD-10-PCS | Performed by: INTERNAL MEDICINE

## 2020-06-16 PROCEDURE — 30233N1 TRANSFUSION OF NONAUTOLOGOUS RED BLOOD CELLS INTO PERIPHERAL VEIN, PERCUTANEOUS APPROACH: ICD-10-PCS | Performed by: INTERNAL MEDICINE

## 2020-06-16 PROCEDURE — 0BH17EZ INSERTION OF ENDOTRACHEAL AIRWAY INTO TRACHEA, VIA NATURAL OR ARTIFICIAL OPENING: ICD-10-PCS | Performed by: INTERNAL MEDICINE

## 2020-06-16 PROCEDURE — BT13YZZ FLUOROSCOPY OF BILATERAL KIDNEYS USING OTHER CONTRAST: ICD-10-PCS | Performed by: RADIOLOGY

## 2020-06-16 RX ADMIN — ALBUMIN HUMAN SCH GM: 250 SOLUTION INTRAVENOUS at 02:29

## 2020-06-16 RX ADMIN — Medication SCH ML: at 10:44

## 2020-06-16 RX ADMIN — ALBUMIN HUMAN SCH GM: 250 SOLUTION INTRAVENOUS at 06:30

## 2020-06-16 RX ADMIN — Medication SCH ML: at 21:20

## 2020-06-16 RX ADMIN — ALBUMIN HUMAN SCH GM: 250 SOLUTION INTRAVENOUS at 12:47

## 2020-06-16 RX ADMIN — VANCOMYCIN HYDROCHLORIDE SCH MG: KIT at 12:47

## 2020-06-16 RX ADMIN — VANCOMYCIN HYDROCHLORIDE SCH MG: KIT at 18:58

## 2020-06-16 RX ADMIN — INSULIN GLARGINE SCH: 100 INJECTION, SOLUTION SUBCUTANEOUS at 21:46

## 2020-06-16 RX ADMIN — INSULIN HUMAN PRN UNIT: 100 INJECTION, SOLUTION PARENTERAL at 07:26

## 2020-06-16 RX ADMIN — VANCOMYCIN HYDROCHLORIDE SCH MG: KIT at 10:42

## 2020-06-16 RX ADMIN — VANCOMYCIN HYDROCHLORIDE SCH: KIT at 00:38

## 2020-06-16 RX ADMIN — INSULIN GLARGINE SCH MLS: 100 INJECTION, SOLUTION SUBCUTANEOUS at 11:08

## 2020-06-16 NOTE — EKG
Test Reason : STAT

Blood Pressure : ***/*** mmHG

Vent. Rate : 147 BPM     Atrial Rate : 141 BPM

   P-R Int : 000 ms          QRS Dur : 072 ms

    QT Int : 312 ms       P-R-T Axes : 000 017 -06 degrees

   QTc Int : 488 ms

 

Atrial fibrillation with rapid ventricular response

Low voltage QRS

Nonspecific ST abnormality

Abnormal ECG

When compared with ECG of 14-JUN-2020 13:35, (Unconfirmed)

Atrial fibrillation has replaced Sinus rhythm

Vent. rate has increased BY  56 BPM

ST now depressed in Anterior leads

Confirmed by DR. ABE NARAYAN ([id number]) on 6/16/2020 5:41:31 PM

 

Referred By:  MIRNA LUO           Confirmed By:DR. ABE NARAYAN

## 2020-06-16 NOTE — ULT
Ultrasound-guided needle access for bilateral percutaneous nephrostomy access:



DATE:

6/16/2020



HISTORY:

65-year-old female with bilateral ureteral obstruction



TECHNIQUE:

Ultrasound was used to assist in obtaining needle access to the bilateral kidneys.

Direct ultrasound-guided needle access was used 2 obtained needle access at the moderately dilated ri
ght renal upper pole calyx.

Ultrasound was used to assist in selecting the puncture site for the contralateral left renal access,
 but the needle advancement itself on the left side was performed with fluoroscopy.



FINDINGS:

A total of 4 sagittal ultrasound images demonstrating the moderately dilated right renal upper pole c
[place].

Images of the left kidney were not saved.



IMPRESSION:

Ultrasound assistance for bilateral percutaneous nephrostomy access.



Reported By: Kenan Snow 

Electronically Signed:  6/16/2020 4:57 PM

## 2020-06-16 NOTE — RAD
EXAM: Single view of the chest



HISTORY:   NG tube placement



COMPARISON: 6/14/2020



FINDINGS: Single view of the chest shows a normal sized cardiomediastinal silhouette.  The Mediport i
s unchanged in position. An endotracheal tube is seen with its tip approximately 1.5 cm above the

adams. An NG tube is seen in the stomach.  Opacity is seen in the right lung apex which may represen
t right upper lobe collapse/atelectasis. The bones are unremarkable.



IMPRESSION: Right upper lobe collapse/atelectasis. The endotracheal tube could be withdrawn approxima
tely 2 cm.



Reported By: Luis Neal 

Electronically Signed:  6/16/2020 7:46 AM

## 2020-06-16 NOTE — PDOC.HOSPP
- Subjective


Encounter Date: 06/15/20


Encounter Time: 20:00


non-verbal


Subjective: 





Patient seen and examined for Sepsis. Lethargic.





- Objective


Vital Signs & Weight: 


 Vital Signs (12 hours)











  Temp Pulse Resp BP BP Pulse Ox


 


 06/16/20 07:05   82   123/55 L  


 


 06/16/20 06:00    16   


 


 06/16/20 04:00  98.8 F   16    100


 


 06/16/20 03:00  98.4 F     


 


 06/16/20 02:46   74   101/43 L  


 


 06/15/20 23:46  99.8 F H     


 


 06/15/20 23:42  99.6 F     


 


 06/15/20 23:00       99


 


 06/15/20 22:40  99.8 F H     


 


 06/15/20 21:47  98.2 F  66  16   93/51 L  100








 Weight











Admit Weight                   147 lb 14.4 oz


 


Weight                         147 lb 14.4 oz











 Most Recent Monitor Data











Heart Rate from ECG            80


 


NIBP                           123/55


 


NIBP BP-Mean                   77


 


Respiration from ECG           20


 


SpO2                           100














I&O: 


 











 06/15/20 06/16/20 06/17/20





 06:59 06:59 06:59


 


Intake Total  2470 


 


Output Total  655 


 


Balance  1815 











Result Diagrams: 


 06/16/20 04:45





 06/16/20 04:45


Additional Labs: 


 Accuchecks











  06/16/20 06/15/20 06/15/20





  00:14 20:10 12:32


 


POC Glucose  259 H  218 H  162 H











Radiology Reviewed by me: Yes (CT abd reviewed)


EKG Reviewed by me: Yes (SR)





Hospitalist ROS





- Review of Systems


ROS unobtainable: due to mental status





- Medication


Medications: 


Active Medications











Generic Name Dose Route Start Last Admin





  Trade Name Freq  PRN Reason Stop Dose Admin


 


Acetaminophen  650 mg  06/14/20 18:37  06/15/20 08:52





  Tylenol  PO   650 mg





  Q4H PRN   Administration





  Headache/Fever/Mild Pain (1-3)   





     





     





     


 


Albumin Human  25 gm  06/15/20 23:59  06/16/20 06:30





  Albumin 25%  IVPB  06/16/20 12:01  25 gm





  Q6HR MARY   Administration





     





     





     





     


 


Ezetimibe  10 mg  06/15/20 21:00  06/15/20 20:00





  Zetia  PO   10 mg





  HS MARY   Administration





     





     





     





     


 


Famotidine  20 mg  06/15/20 09:00  06/15/20 08:52





  Pepcid  PO   20 mg





  QAM MARY   Administration





     





     





     





     


 


Gabapentin  600 mg  06/15/20 09:00  06/15/20 20:00





  Neurontin  PO   Not Given





  TID MARY   





     





     





     





     


 


Aztreonam 0.5 gm/ Sodium  100 mls @ 100 mls/hr  06/15/20 02:00  06/16/20 02:30





  Chloride  IVPB   100 mls





  0200,1400 MARY   Administration





     





     





     





     


 


Insulin Glargine 10 units/  0.1 mls @ 0 mls/hr  06/15/20 21:00  06/15/20 20:16





  Miscellaneous Medication  SC   Not Given





  HS MARY   





     





     





     





     


 


Sodium Bicarbonate 150 meq/  1,150 mls @ 100 mls/hr  06/16/20 00:06  06/16/20 02

:30





  Dextrose/Water  IV   1,150 mls





  .[id number] MARY   Administration





     





     





     





     


 


Insulin Human Regular  0 units  06/14/20 18:33  06/16/20 07:26





  Humulin R  SC   4 unit





  .MODERATE SLIDING SC PRN   Administration





  Moderate Correctional Scale   





     





     





     


 


Loratadine  10 mg  06/15/20 09:00  06/15/20 08:53





  Claritin  PO   10 mg





  DAILY MARY   Administration





     





     





     





     


 


Propofol  1,000 mg  06/16/20 02:08  06/16/20 02:35





  Diprivan  IV  07/16/20 02:08  1,000 mg





  INF PRN   Administration





  TO ACHIEVE GOAL RASS   





     





  Protocol   





     


 


Rosuvastatin Calcium  40 mg  06/15/20 21:00  06/15/20 20:00





  Crestor  PO   40 mg





  HS MARY   Administration





     





     





     





     


 


Saccharomyces Boulardii  250 mg  06/14/20 21:00  06/15/20 20:00





  Florastor  PO   Not Given





  BID MARY   





     





     





     





     


 


Sertraline HCl  100 mg  06/15/20 09:00  06/15/20 08:52





  Zoloft  PO   100 mg





  DAILY MARY   Administration





     





     





     





     


 


Sodium Chloride  10 ml  06/14/20 21:00  06/15/20 20:16





  Flush - Normal Saline  IVF   Not Given





  Q12HR MARY   





     





     





     





     


 


Vancomycin HCl  125 mg  06/14/20 23:59  06/16/20 00:38





  First Vancomycin  PO   Not Given





  Q6HR MARY   





     





     





     





     














- Exam


General Appearance: ill appearing


General - other findings: lethargic


Heart: RRR, no gallops, no rubs


Heart - other findings: weak radial pulse


Respiratory - other findings: dec AE at bases


Gastrointestinal: soft, normal bowel sounds, no guarding, no rigidity


Psychiatric: lethargic





Hosp A/P





- Plan


DVT proph w/SCDs





Severe sepsis due to Proteus UTI and bacteremia


Obstructive uropathy


MULUGETA


Metabolic acidosis due to sepsis


DM2


CLL


Other issues per previous notes





PLAN:


Stat ABG


Transfer to [facility]


Cont Azactam


Bicarb drip


Family updated


IVF bolus

## 2020-06-16 NOTE — PRG
DATE OF SERVICE:  06/16/2020



SUBJECTIVE:  The patient was intubated and transferred to CCU yesterday.  She

remains intubated and is unable to answer any questions this morning. 



Low urine output overnight.  Catheter draining well with dark urine. 



No CVA tenderness.  No suprapubic tenderness apparent.



LABORATORY DATA:  Creatinine has risen from 2.18 yesterday to 2.94 today.

Urinalysis has returned Proteus mirabilis, sensitive to all but nitrofurantoin. 



IMAGING STUDIES:  Ultrasound from yesterday shows persistent hydronephrosis 
despite

ureteral stents in good position and Patterson catheter draining the bladder. 



ASSESSMENT AND PLAN:  Bilateral ureteral obstruction due to lymphadenopathy

secondary to CLL, extrinsic obstruction of ureteral stents causing 
hydronephrosis

despite adequate drainage, sepsis secondary to urinary tract infection. 



Given that the patient is now developing worsening renal function despite 
indwelling

stents and Patterson catheter with ultrasound showing persistent hydronephrosis, I

recommended and ordered bilateral nephrostomy tubes.  I spoke with Dr. nSow 

this morning, who advised that the CT scanner that they would use for this is 
down

currently and that he does not feel that ultrasound guided nephrostomy tube

placement is feasible in her case given her body habitus and degree of 
obstruction.  I have now spoken with Dr. Marks, the attending

of record and advised that this patient needs nephrostomy tubes to protect renal

function promptly and unfortunately, this is not available at our facility.  She

will need to be transferred to another facility to perform nephrostomy tube

placement. 



Her ureteral stents will remain even after nephrostomy tube placement until she 
is

clinically much more stable.  I do not think that her sepsis secondary to 
urinary

tract 

infection will improve with antibiotic treatment only, she requires drainage of 
her

renal units. 







Job ID:  [id number]



[facility]

## 2020-06-16 NOTE — CON
DATE OF CONSULTATION:  06/16/2020



HISTORY OF PRESENT ILLNESS:  Dorothy Jean Carrell is a 65-year-old female, who was

intubated last night with progressive respiratory failure.  Her blood gas shows a

pO2 of 84, pCO2 OF 57, pH of 7.19.  She was started on a bicarb drip.  Her

creatinine is 2.9, BUN is 58.  She has been in the hospital here now for several

days with a diagnosis of hydronephrosis.  She is being followed by Urology.  She has

ureteral obstruction from a retroperitoneal lymph node.  She has bilateral stent

placement.  Her CLL has been followed by a local oncologist. 



She presented to the ER with abdominal pain, nausea, vomiting, and fever. 



She has Proteus in the urine, started on broad-spectrum antibiotics. 



CT of abdomen showed a mid left hydroureter, hydronephrosis, and mild-to-moderate

hydroureter and hydronephrosis on the right side. 



She became confused, lethargic yesterday requiring intubation.  Presently, she is

intubated in the vent, mildly sedated, and she is hypotensive. 



PAST MEDICAL HISTORY:  CLL with abdominal lymphadenopathy, history of cervical

cancer, UTI, coronary artery disease, diabetes, fibromyalgia, migraine, and

depression. 



PAST SURGICAL HISTORY:  Stents placed in ureter, bilateral tubal ligation,

appendectomy, hysterectomy, tonsillectomy, back surgery, bladder surgery. 



SOCIAL HISTORY:  No alcohol or tobacco abuse.



HOME MEDICATIONS:  Include:

1. Zanaflex.

2. Vfend 200.

3. Zoloft 100.

4. Crestor 40.

5. Gabapentin 600.

6. Zetia 10.

7. Tenormin 25.

8. Eliquis 5 twice a day.



ALLERGIES:  NONE.



REVIEW OF SYSTEMS:  Otherwise, negative.



PHYSICAL EXAMINATION:

VITAL SIGNS:  Blood pressure is 98/60, pulse 60, saturations 100%, respiratory rate

16. 

CHEST:  Decreased breath sounds. No wheezing. 

CARDIAC:  Normal S1, S2.  No gallops. 

ABDOMEN:  No masses.



DIAGNOSTIC STUDIES:  X-ray shows a right upper lung atelectatic changes, probably

postintubation, mucous plugging. 



White count 2000, H and H 7 and 25, platelet count 243.  PO2 , 100%, rate at

20.  Creatinine 2, BUN 58, alkaline phosphatase is 1074. 



ASSESSMENT AND PLAN:  

1. Respiratory failure, mixed, metabolic and respiratory acidosis.

2. Right upper lung atelectasis.

3. Urinary tract infection.

4. Bilateral hydronephrosis.

5. Chronic lymphocytic leukemia with abdominal lymphadenopathy.

6. Depression. 

The patient, apparently, may be transferred to [facility] as per Nephrology.  From a

pulmonary standpoint of view, she is stable to be transferred.  I am going to give

her a small volume. 



250 of Plasmanate, otherwise antibiotics as per Infectious Disease.  Supportive care.



CRITICAL CARE TIME:  45 minutes.





Job ID:  [id number]

## 2020-06-16 NOTE — PDOC.BPN
- Brief Progress Note





See and examined at bedside after informed by primary RN of patient progressive 

decline.  Remains unresponsive to painful stimuli with ABG showing Ph of 7.1 

and worsening CO2 retention.  Currently being treated for possible UTI with 

broad spectrum ABX.  Given depressed mental status patient may require 

intubation.  Case discussed with on call pulmonary attending Dr. Sellers who 

will see patient in consultation in AM.  Antesthesia has been paged to assist 

with intubation.  Will continue to monitor closely.

## 2020-06-16 NOTE — SPC
PERCUTANEOUS NEPHROSTOMY BILATERAL

NEPHROSTOGRAMS BILATERAL:



DATE:

6/16/2020



HISTORY:

65-year-old female with bilateral ureteral obstruction. Malfunctioning bilateral ureteral stents: Sruthi lent has developed bilateral hydronephrosis despite the stents.



TECHNIQUE:

Proxy signed informed consent obtained. Procedure was performed with assistance of anesthesiology ser
vice. Patient already intubated when she was brought to the special procedures suite. She was

placed prone on the special procedures fluoroscopy table. Right side was selected first. Right flank 
skin was prepared and draped in usual sterile fashion. 25-gauge needle was used to apply buffered

lidocaine. Under ultrasound guidance, a 22-gauge AccuStick needle was advanced under ultrasound julio
nce, into the dilated right renal upper pole calyx. Upon brisk return of urine through the

AccuStick needle, iodinated contrast Isovue was injected under fluoroscopy, opacifying the right anjum
l collecting system. A 0.018 inch guidewire was advanced through the AccuStick needle into the

renal pelvis, then into the proximal ureter. A blade, followed by hemostat blunt dissection, was used
 to enlarge the needle entry site. The 22-gauge AccuStick needle was exchanged over the 0.018 inch

guidewire for a 5 Bulgarian dilator with sheath. The dilator and 0.018 inch guidewire were removed. A 0.
035 inch floppy J-tipped guidewire was advanced through the 5 Bulgarian sheath under fluoroscopy

guidance, down the ureter, and into the bladder. The 5 Bulgarian sheath was exchanged over that guidewir
e for an 8 Bulgarian dilator. This dilator was then exchanged over the guidewire for an 8 Bulgarian

nephrostomy catheter with inner stiffener. The nephrostomy catheter was advanced beyond the inner sti
ffener into the proximal ureter. The guidewire and stiffener were removed. The nephrostomy catheter

was pulled back into the right renal pelvis. Pigtail loop was formed and locked into place. Contrast 
was injected into the catheter to confirm good position in the renal pelvis, then was aspirated.

The nephrostomy tube was sutured into place at the skin. No complications.



For the left kidney, ultrasound was used to assist selection of puncture site at the skin. 25-gauge n
eedle was used to apply buffered lidocaine. 22-gauge AccuStick needle was advanced under

fluoroscopic guidance, targeting the upper pole calyx using the the upper pigtail loop of the existin
g left ureteral stent as the target. The rest of the left-sided procedure was performed in

identical fashion to that of the right. The pigtail loop of the right left nephrostomy catheter was a
lso placed in the left renal pelvis (the upper pigtail loop of the existing ureteral stent is at

upper pole calyx).



IMPRESSION:

Successful bilateral percutaneous nephrostomy, with placement of 8 Bulgarian bilateral nephrostomy tubes
.



Reported By: Kenan Snow 

Electronically Signed:  6/16/2020 5:17 PM

## 2020-06-16 NOTE — RAD
EXAM: Single view of the chest



HISTORY:   Intubation



COMPARISON: 6/14/2020 and 6/16/2020



FINDINGS: Single view of the chest shows a normal sized cardiomediastinal silhouette.  An endotrachea
l tube is seen with its tip approximately 1.5 cm from the adams. The Mediport is unchanged in

position.  There is no evidence of consolidation, mass, or pleural effusion. The bones are unremarkab
le.



IMPRESSION: Endotracheal tube position as above. A subsequent radiograph shows collapse of the right 
upper lobe and endotracheal tube may be due to be withdrawn approximately 2 cm.



Reported By: Luis Neal 

Electronically Signed:  6/16/2020 7:48 AM

## 2020-06-16 NOTE — PDOC.HOSPP
- Subjective


Encounter Date: 06/16/20


Encounter Time: 11:45


Subjective: 





Patient seen and examined for Sepsis. On Vent. Overnight events noted





- Objective


Vital Signs & Weight: 


 Vital Signs (12 hours)











  Temp Pulse Resp BP BP Pulse Ox


 


 06/16/20 18:25   136 H    


 


 06/16/20 18:16   158 H   71/42 L  


 


 06/16/20 18:00    13   


 


 06/16/20 16:00  98.9 F  125 H  11 L   90/59 L  100


 


 06/16/20 15:55   145 H  12   108/69  100


 


 06/16/20 15:50   135 H  14   101/56 L  100


 


 06/16/20 15:45   138 H  20   111/55 L  100


 


 06/16/20 15:40   138 H  17   113/59 L  100


 


 06/16/20 15:35   64  12   109/62  100


 


 06/16/20 15:30   139 H  17   99/63  100


 


 06/16/20 15:25   118 H  13   102/71  100


 


 06/16/20 15:15   67  10 L   115/44 L  100


 


 06/16/20 15:00   65  15   104/49 L  100


 


 06/16/20 14:45   67  15   106/48 L  100


 


 06/16/20 14:30   67  14   101/48 L  100


 


 06/16/20 14:15   65  19   101/41 L  100


 


 06/16/20 12:53   75   84/45 L  


 


 06/16/20 12:00    10 L   


 


 06/16/20 10:54   66    


 


 06/16/20 10:00    10 L   








 Weight











Admit Weight                   147 lb 14.4 oz


 


Weight                         147 lb 14.4 oz











 Most Recent Monitor Data











Heart Rate from ECG            155


 


NIBP                           73/42


 


NIBP BP-Mean                   52


 


Respiration from ECG           9


 


SpO2                           92














I&O: 


 











 06/15/20 06/16/20 06/17/20





 06:59 06:59 06:59


 


Intake Total  2470 430


 


Output Total  655 183


 


Balance  1815 247











Result Diagrams: 


 06/16/20 17:15





 06/16/20 17:33


Additional Labs: 


 Accuchecks











  06/16/20 06/16/20 06/16/20





  17:00 11:06 00:14


 


POC Glucose  103  105  259 H














  06/15/20





  20:10


 


POC Glucose  218 H











Radiology Reviewed by me: Yes (CXR - no infiltrate)


EKG Reviewed by me: Yes (Tele SR)





Hospitalist ROS





- Review of Systems


ROS unobtainable: due to mental status





- Medication


Medications: 


Active Medications











Generic Name Dose Route Start Last Admin





  Trade Name Freq  PRN Reason Stop Dose Admin


 


Acetaminophen  650 mg  06/14/20 18:37  06/15/20 08:52





  Tylenol  PO   650 mg





  Q4H PRN   Administration





  Headache/Fever/Mild Pain (1-3)   





     





     





     


 


Albuterol/Ipratropium  3 ml  06/16/20 13:00  06/16/20 18:18





  Duoneb  NEB   Not Given





  [id number] MARY   





     





     





     





     


 


Ezetimibe  10 mg  06/15/20 21:00  06/15/20 20:00





  Zetia  PO   10 mg





  HS MARY   Administration





     





     





     





     


 


Famotidine  20 mg  06/15/20 09:00  06/16/20 10:43





  Pepcid  PO   20 mg





  QAM MARY   Administration





     





     





     





     


 


Gabapentin  600 mg  06/15/20 09:00  06/16/20 17:08





  Neurontin  PO   600 mg





  TID MARY   Administration





     





     





     





     


 


Aztreonam 0.5 gm/ Sodium  100 mls @ 100 mls/hr  06/15/20 02:00  06/16/20 17:08





  Chloride  IVPB   100 mls





  0200,1400 MARY   Administration





     





     





     





     


 


Insulin Glargine 10 units/  0.1 mls @ 0 mls/hr  06/16/20 09:00  06/16/20 11:08





  Miscellaneous Medication  SC   0.1 mls





  QAM MARY   Administration





     





     





     





     


 


Insulin Glargine 10 units/  0.1 mls @ 0 mls/hr  06/15/20 21:00  06/15/20 20:16





  Miscellaneous Medication  SC   Not Given





  HS MARY   





     





     





     





     


 


Sodium Bicarbonate 150 meq/  1,150 mls @ 100 mls/hr  06/16/20 00:06  06/16/20 11

:32





  Dextrose/Water  IV   1,150 mls





  [id number] MARY   Administration





     





     





     





     


 


Amiodarone HCl 450 mg/  259 mls @ 0 mls/hr  06/16/20 16:45  06/16/20 17:41





  Dextrose/Water  IVPB   259 mls





  INF MARY   Administration





     





     





  Protocol   





  Per Protocol   


 


Potassium Chloride 20 meq/  100 mls @ 50 mls/hr  06/16/20 18:30  06/16/20 18:56





  Device  IVPB  06/16/20 20:30  Not Given





  NOW MARY   





     





     





     





     


 


Insulin Human Regular  0 units  06/14/20 18:33  06/16/20 07:26





  Humulin R  SC   4 unit





  .MODERATE SLIDING SC PRN   Administration





  Moderate Correctional Scale   





     





     





     


 


Loratadine  10 mg  06/15/20 09:00  06/16/20 10:43





  Claritin  PO   10 mg





  DAILY MARY   Administration





     





     





     





     


 


Metoprolol Tartrate  12.5 mg  06/16/20 09:00  06/16/20 10:43





  Lopressor  PO   Not Given





  BID MARY   





     





     





     





     


 


Potassium Chloride  20 meq  06/16/20 19:00  06/16/20 18:30





  Kcl  IVPB  06/16/20 21:00  20 meq





  NOW MARY   Administration





     





     





     





     


 


Propofol  1,000 mg  06/16/20 02:08  06/16/20 02:35





  Diprivan  IV  07/16/20 02:08  1,000 mg





  INF PRN   Administration





  TO ACHIEVE GOAL RASS   





     





  Protocol   





     


 


Rosuvastatin Calcium  40 mg  06/15/20 21:00  06/15/20 20:00





  Crestor  PO   40 mg





  HS MARY   Administration





     





     





     





     


 


Saccharomyces Boulardii  250 mg  06/14/20 21:00  06/16/20 10:43





  Florastor  PO   250 mg





  BID MARY   Administration





     





     





     





     


 


Sertraline HCl  100 mg  06/15/20 09:00  06/16/20 10:42





  Zoloft  PO   100 mg





  DAILY MARY   Administration





     





     





     





     


 


Sodium Chloride  10 ml  06/14/20 21:00  06/16/20 10:44





  Flush - Normal Saline  IVF   10 ml





  Q12HR MARY   Administration





     





     





     





     


 


Tbo-Filgrastim  480 mcg  06/16/20 09:00  06/16/20 10:44





  Granix  SC   480 mcg





  DAILY MARY   Administration





     





     





     





     


 


Vancomycin HCl  125 mg  06/14/20 23:59  06/16/20 18:58





  First Vancomycin  PO   125 mg





  Q6HR MARY   Administration





     





     





     





     














- Exam


General Appearance: NAD, ill appearing


General - other findings: on Vent - not on sedation


Heart: RRR, no gallops, no rubs, normal peripheral pulses


Respiratory: no wheezes, no rales, normal chest expansion, rhonchi


Gastrointestinal: soft, normal bowel sounds, no guarding, no rigidity


Extremities: no cyanosis, no clubbing


Musculoskeletal - other findings: Neuro/Psych - cannot assess due to current 

mentation





Hosp A/P





- Plan


DVT proph w/SCDs





Severe sepsis due to Proteus UTI and bacteremia


Acute hypoxic resp failure


Obstructive uropathy 


Anemia prob due to CLL s/p 1 unit PRBC


MULUGETA


Metabolic acidosis due to sepsis


DM2


CLL


Other issues per previous notes





PLAN:


Cont Azactam @ renal dosing


Cont Bicarb drip


Nephrostomy tube today


Cont Vent support


Cont sliding scale


Onc/Critical care input appreciated


AM labs

## 2020-06-16 NOTE — PDOC.MOPN
Interval History: 





Pt was intubated overnight for hypercapnic respiratory failure. She is 

responsive to pain only, but this is improved since last night. Spoke with 

daughter at bedside.





- Vital Signs


Vital Signs: 


 Vital Signs (12 hours)











  Temp Pulse Resp BP BP Pulse Ox


 


 06/16/20 07:05   82   123/55 L  


 


 06/16/20 06:00    16   


 


 06/16/20 04:00  98.8 F   16    100


 


 06/16/20 03:00  98.4 F     


 


 06/16/20 02:46   74   101/43 L  


 


 06/15/20 23:46  99.8 F H     


 


 06/15/20 23:42  99.6 F     


 


 06/15/20 23:00       99


 


 06/15/20 22:40  99.8 F H     


 


 06/15/20 21:47  98.2 F  66  16   93/51 L  100








 Weight











Admit Weight                   147 lb 14.4 oz


 


Weight                         147 lb 14.4 oz











 Most Recent Monitor Data











Heart Rate from ECG            80


 


NIBP                           123/55


 


NIBP BP-Mean                   77


 


Respiration from ECG           20


 


SpO2                           100

















- Physical Exam


General: Other (responds to touch)


HEENT: Other (intubated with ETT/OT in place)


Lungs: Normal air movement


Cardiovascular: Regular rate


Abdomen: Soft


Neurological: Other (moves extremities to touch)





- Labs


Result Diagrams: 


 06/16/20 04:45





 06/16/20 04:45


Lab results: 


 Laboratory Results - last 24 hr





06/16/20 04:45: WBC 2.2 L, RBC 2.90 L, Hgb 7.9 L, Hct 25.6 L, MCV 88.0, MCH 27.1

, MCHC 30.7 L, RDW 13.4, Plt Count 243, MPV 9.2, Neutrophils % (Manual) 26 L, 

Band Neuts % (Manual) 6, Lymphocytes % (Manual) 36, Monocytes % (Manual) 32 H, 

Hypochromia SLIGHT = 6-15 cells, Plt Morphology Comment Appears Adequate


06/16/20 04:45: Sodium 136, Potassium 3.5, Chloride 104, Carbon Dioxide 19 L, 

Anion Gap 17, BUN 58 H, Creatinine 2.94 H, Estimated GFR (MDRD) 16, Glucose 225 

H, Calcium 7.5 L, Magnesium 1.9, Total Bilirubin 1.5 H, AST 33, ALT 7 L, 

Alkaline Phosphatase 1074 H, Serum Total Protein 4.9 L, Albumin 3.0 L, Globulin 

1.9 L, Albumin/Globulin Ratio 1.6


06/16/20 02:20: Specimen Type ARTERIAL, Puncture Site LRA, Bicarbonate Actual 

17.4 L, ABG pH 7.40, ABG pCO2 28.6 L, ABG pO2 273.7 H, ABG O2 Sat (Measured) 

99.8 H, ABG O2 Content 11.8 L, ABG Base Excess -6.5 L, ABG Hematocrit 24.0 L, 

ABG Hemoglobin 8.0 L, ABG Oxyhemoglobin 98.7 H, ABG Carboxyhemoglobin 0.8, ABG 

Methemoglobin 0.30, ABG Deoxyhemoglobin 0.2, Mayo Test POSITIVE, A-a O2 

Gradient 403.550 H, Sodium 131 L, Potassium 3.96, Chloride 103, Ionized Calcium 

1.04 L, Mode of Support SIMV, Mechanical Rate 20, Inspired O2 100, Tidal Volume 

500, Pressure Support 10, PEEP or CPAP 5.0


06/16/20 01:05: Blood Type O POSITIVE


06/16/20 00:45: Specimen Type ART, Puncture Site LBR, Bicarbonate Actual 21.4 L

, ABG pH 7.19 L*, ABG pCO2 57.4 H, ABG pO2 84.0 H, ABG O2 Sat (Measured) 95.0, 

ABG O2 Content 10.3 L, ABG Base Excess -6.5 L, ABG Hematocrit 23.0 L, ABG 

Hemoglobin 7.7 L, ABG Oxyhemoglobin 95.0, ABG Carboxyhemoglobin 0.6, ABG 

Methemoglobin 0.30, A-a O2 Gradient 43.890 H, Sodium 132 L, Potassium 4.00, 

Chloride 102, Ionized Calcium 1.12, Mode of Support NC, Inspired O2 28


06/16/20 00:14: POC Glucose 259 H


06/16/20 00:04: Blood Type O POSITIVE, Antibody Screen NEGATIVE, Crossmatch See 

Detail


06/15/20 21:34: WBC 2.5 L, RBC 2.42 L, Hgb 6.8 L, Hct 21.7 L, MCV 89.6, MCH 28.0

, MCHC 31.2 L, RDW 13.3, Plt Count 246, MPV 9.0, Neutrophils % (Manual) 28 L, 

Band Neuts % (Manual) 14 H, Lymphocytes % (Manual) 16 L, Monocytes % (Manual) 

42 H, Lymphocytes # Not Reportable, Hypochromia SLIGHT = 6-15 cells, Plt 

Morphology Comment Appears Adequate


06/15/20 21:34: Lactic Acid 0.7


06/15/20 21:34: Cortisol 15.30


06/15/20 21:34: Troponin I 0.057 H


06/15/20 21:34: Sodium 133 L, Potassium 3.9, Chloride 106, Carbon Dioxide 20 L, 

Anion Gap 11, BUN 55 H, Creatinine 2.69 H, Estimated GFR (MDRD) 18, Glucose 208 

H, Calcium 7.6 L


06/15/20 21:14: Specimen Type ARTERIAL, Puncture Site LRA, Bicarbonate Actual 

17.9 L, ABG pH 7.18 L*, ABG pCO2 49.5 H, ABG pO2 84.1 H, ABG O2 Sat (Measured) 

94.3, ABG O2 Content 12.4 L, ABG Base Excess -10.0 L, ABG Hematocrit 27.0 L, 

ABG Hemoglobin 9.3 L, ABG Oxyhemoglobin 93.5 L, ABG Carboxyhemoglobin 0.5, ABG 

Methemoglobin 0.30, ABG Deoxyhemoglobin 5.7 H, Mayo Test POSITIVE, A-a O2 

Gradient 82.185 H, Sodium 132 L, Potassium 3.94, Chloride 105, Ionized Calcium 

1.16, Mode of Support NC, Inspired O2 32


06/15/20 20:10: POC Glucose 218 H


06/15/20 12:32: POC Glucose 162 H


06/14/20 20:30: COVID-19 PCR Not Detected











A/P





- Problem


(1) Acute kidney injury


Current Visit: No   Code(s): N17.9 - ACUTE KIDNEY FAILURE, UNSPECIFIED   Status

: Acute   





(2) Urinary tract infection


Current Visit: No   Status: Acute   





(3) Chronic lymphocytic leukemia (CLL), B-cell


Current Visit: No   Code(s): C91.10 - CHRONIC LYMPHOCYTIC LEUK OF B-CELL TYPE 

NOT ACHIEVE REMIS   Status: Chronic   





- Plan


Plan: 





Start Granix daily until ANC > 1000


Cont holding Venetoclax


Pulmonary care as per Pulm: ABG improved quickly, pH now normal, on minimal 

vent settings


Possible nephrostomy tubes with Dr. Bahena and then remove stents: likely 

need to wait until more stable, extubated


Cont antibiotics for Proteus as per Dr. Guzman, f/u sensitivities

## 2020-06-17 LAB
ALBUMIN SERPL BCG-MCNC: 3 G/DL (ref 3.4–4.8)
ALP SERPL-CCNC: 1541 U/L (ref 40–110)
ALT SERPL W P-5'-P-CCNC: 10 U/L (ref 8–55)
ANION GAP SERPL CALC-SCNC: 17 MMOL/L (ref 10–20)
AST SERPL-CCNC: 52 U/L (ref 5–34)
BILIRUB SERPL-MCNC: 2.4 MG/DL (ref 0.2–1.2)
BUN SERPL-MCNC: 58 MG/DL (ref 9.8–20.1)
CALCIUM SERPL-MCNC: 7.1 MG/DL (ref 7.8–10.44)
CHLORIDE SERPL-SCNC: 101 MMOL/L (ref 98–107)
CO2 SERPL-SCNC: 21 MMOL/L (ref 23–31)
CREAT CL PREDICTED SERPL C-G-VRATE: 17 ML/MIN (ref 70–130)
GLOBULIN SER CALC-MCNC: 1.7 G/DL (ref 2.4–3.5)
GLUCOSE SERPL-MCNC: 199 MG/DL (ref 80–115)
HGB BLD-MCNC: 9.1 G/DL (ref 12–16)
MAGNESIUM SERPL-MCNC: 1.6 MG/DL (ref 1.6–2.6)
MCH RBC QN AUTO: 28.5 PG (ref 27–31)
MCV RBC AUTO: 87.6 FL (ref 78–98)
MDIFF COMPLETE?: YES
PLATELET # BLD AUTO: 495 THOU/UL (ref 130–400)
POTASSIUM SERPL-SCNC: 3.6 MMOL/L (ref 3.5–5.1)
RBC # BLD AUTO: 3.18 MILL/UL (ref 4.2–5.4)
SODIUM SERPL-SCNC: 135 MMOL/L (ref 136–145)
WBC # BLD AUTO: 13.1 THOU/UL (ref 4.8–10.8)

## 2020-06-17 RX ADMIN — INSULIN GLARGINE SCH MLS: 100 INJECTION, SOLUTION SUBCUTANEOUS at 22:56

## 2020-06-17 RX ADMIN — VANCOMYCIN HYDROCHLORIDE SCH MG: KIT at 00:22

## 2020-06-17 RX ADMIN — INSULIN HUMAN PRN UNIT: 100 INJECTION, SOLUTION PARENTERAL at 22:56

## 2020-06-17 RX ADMIN — INSULIN HUMAN PRN UNIT: 100 INJECTION, SOLUTION PARENTERAL at 17:34

## 2020-06-17 RX ADMIN — VANCOMYCIN HYDROCHLORIDE SCH MG: KIT at 18:17

## 2020-06-17 RX ADMIN — VANCOMYCIN HYDROCHLORIDE SCH MG: KIT at 05:27

## 2020-06-17 RX ADMIN — INSULIN GLARGINE SCH: 100 INJECTION, SOLUTION SUBCUTANEOUS at 09:04

## 2020-06-17 RX ADMIN — INSULIN HUMAN PRN UNIT: 100 INJECTION, SOLUTION PARENTERAL at 11:37

## 2020-06-17 RX ADMIN — VANCOMYCIN HYDROCHLORIDE SCH MG: KIT at 12:09

## 2020-06-17 RX ADMIN — Medication SCH ML: at 22:43

## 2020-06-17 RX ADMIN — INSULIN HUMAN PRN UNIT: 100 INJECTION, SOLUTION PARENTERAL at 04:31

## 2020-06-17 RX ADMIN — Medication SCH ML: at 08:16

## 2020-06-17 NOTE — PDOC.PALCO
Palliative Care Consult





- Consult Details


Requesting Physician: Dr Marks


Reason for Consult: goals of care, advance directives assistance, family support





- Pertinent HPI





65 year old female with who was currently being treated by Dr Silva for CLL, 

chronic UTI with bilateral stents in place. Presented to the emergency room 

with progressing weakness, nausea, vomiting, abdominal pain and fever. No 

relieving factors.  Was to have bilateral stents removes next week, recent 

discharge from hospital 5/27 where she was managed for a UTI and discharged 

home with abx and antifungal. On presentation to the emergency room for above 

complaints she was noted to be tachycardic, tachypneic, and right hydronephrosis

, UTI.  Admitted for medical management. After admission continued to decline 

and required intubation and mechanical ventilation.  





- Pertinent PMH





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 

Migraine, Anxiety and depression





- Social History


Smoking Status: Never smoker


Smoking: no tobacco exposure


Alcohol Use: none


Drug Use History: none


Living Situation: independent (Assistance in home setting with paid caregiver)





- Medications


MAR Reviewed: Yes





- Allergies


Allergies/Adverse Reactions: 


 Allergies











Allergy/AdvReac Type Severity Reaction Status Date / Time


 


aspirin Allergy Severe  Verified 06/14/20 19:23


 


cephalexin monohydrate Allergy Severe Short of Verified 06/14/20 19:23





[From Keflex]   Breath  


 


Cephalosporins Allergy Severe Short of Verified 06/14/20 19:23





   Breath  


 


ciprofloxacin [From Cipro] Allergy Severe Severe Verified 06/15/20 16:51





   Hives  


 


Penicillins Allergy Severe Rash Verified 06/14/20 19:23


 


sulfamethoxazole Allergy Severe Severe Verified 06/15/20 16:51





[From Bactrim]   Hives  


 


trimethoprim [From Bactrim] Allergy Severe Severe Verified 06/15/20 16:51





   Hives  














- Subjective





Intubated, awakens easily to gentle stimulation, lethargic. No sedation.  Poor 

historian for ROS secondary to lethargy





- ROS


Non Response: due to endotracheal tube, due to mental status





- Objective


Vital Signs: 


 Vital Signs - Most Recent











Temp Pulse Resp BP Pulse Ox


 


 98.4 F   66   11 L  118/67   100 


 


 06/17/20 08:00  06/17/20 12:42  06/17/20 12:00  06/17/20 12:42  06/17/20 08:00











Palliative Performance Scale: 20





- Physical Exam


Constitutional: ill appearing


HEENT: EOMI, moist MMs


Respiratory: no wheezing, diminished lung sound


Deviation from normal: Mechanical ventilation


Cardiovascular: irregular


Gastrointestinal: soft, positive bowel sounds


Musculoskeletal: no cyanosis, no clubbing


Neurology: no focal deficits


Skin: cap refill <2 seconds, no lesions, no rash


Deviation from normal: encephalopathic, oriented to self, place





- Problem List


(1) Palliative care encounter


Code(s): Z51.5 - ENCOUNTER FOR PALLIATIVE CARE   Current Visit: Yes   Status: 

Acute   





(2) Acute kidney injury


Code(s): N17.9 - ACUTE KIDNEY FAILURE, UNSPECIFIED   Current Visit: No   Status

: Acute   





(3) Urinary tract infection


Current Visit: No   Status: Acute   





(4) Chronic lymphocytic leukemia (CLL), B-cell


Code(s): C91.10 - CHRONIC LYMPHOCYTIC LEUK OF B-CELL TYPE NOT ACHIEVE REMIS   

Current Visit: No   Status: Chronic   





(5) Diabetes


Code(s): E11.9 - TYPE 2 DIABETES MELLITUS WITHOUT COMPLICATIONS   Current Visit

: No   Status: Chronic   


Qualifiers: 


   Diabetes mellitus type: type 2   Diabetes mellitus complication status: 

without complication   Qualified Code(s): E11.9 - Type 2 diabetes mellitus 

without complications   





(6) Sepsis


Code(s): A41.9 - SEPSIS, UNSPECIFIED ORGANISM   Current Visit: No   Status: 

Suspected   


Qualifiers: 


   Sepsis acute organ dysfunction status: with acute organ dysfunction   Severe 

sepsis acute organ dysfunction type: acute renal failure   Severe sepsis shock 

status: with septic shock 





- Plan/Recommendations


Plan:


Palliative care introduced to patient and family.  Discussed current multiple 

morbidities and Goal of Care. Family and patient do not desire to have 

intubation occur for lengthy period of time. Discussed hope for successful 

extubation.  





Will hope for progress over the next 24 hours and revisit goal of care and 

resuscitation status.  Plan is that if she is successfully extubated to 

transition to a DNAR.





Please also refer to [facility] RN Palliative Care notes in note section.








[45] minutes spent on this encounter with >50% of the time in counseling and 

coordination of care.





Thank you for this very appropriate consult.

## 2020-06-17 NOTE — PRG
DATE OF SERVICE:  06/17/2020



OBJECTIVE:  VITAL SIGNS:  Heart rates in the 60s, respiratory rates in the 20s,

blood pressure 124/57.  Events over the last 24 hours have been reviewed. 

LUNGS:  Clear anteriorly. 

HEART:  Regular rhythm. 

ABDOMEN:  Soft. 

EXTREMITIES:  Without edema. 



Apparently, the patient had communicated with the family that this aggressive 
care

was not consistent with her wishes. 



She had nephrostomy tubes placed.



IMPRESSION:  Pyelonephritis with Proteus growing from her blood and her urine.



PLAN:  Continue supportive care. 



Blood gas shows pH of 7.4, CO2 of 28, pO2 of 273. 



Creatinine is 3.48, BUN is 58. 



White count is 13, hemoglobin 9, and platelets 495. 



We think we might be able to consider a spontaneous breathing trial in the 
morning,

possible extubation. 



Critical care time 30 min.



Job ID:  [id number]



MTDD

## 2020-06-17 NOTE — PDOC.MOPN
Interval History: 





remains intubated, sedated





- Vital Signs


Vital Signs: 


 Vital Signs (12 hours)











  Temp Pulse Resp BP


 


 06/17/20 10:33   97   101/55 L


 


 06/17/20 08:00  98.4 F   10 L 


 


 06/17/20 06:57   58 L   111/50 L


 


 06/17/20 06:00    11 L 


 


 06/17/20 04:00  99.2 F   11 L 


 


 06/17/20 02:15   71   115/47 L


 


 06/17/20 02:00    10 L 


 


 06/17/20 00:20   65   112/47 L


 


 06/17/20 00:00  101.5 F H   12 








 Weight











Admit Weight                   147 lb 14.4 oz


 


Weight                         147 lb 14.4 oz











 Most Recent Monitor Data











Heart Rate from ECG            115


 


NIBP                           96/62


 


NIBP BP-Mean                   73


 


Respiration from ECG           9


 


SpO2                           98

















- Physical Exam


General: No acute distress


Lungs: Other (on vent)


Cardiovascular: Regular rate


Extremities: Other


Neurological: Other (sedated)





- Labs


Result Diagrams: 


 06/17/20 03:00





 06/17/20 03:00


Lab results: 


 Laboratory Results - last 24 hr





06/17/20 03:00: WBC 13.1 H, RBC 3.18 L, Hgb 9.1 L, Hct 27.9 L, MCV 87.6, MCH 

28.5, MCHC 32.5, RDW 13.7, Plt Count 495 H, MPV 8.7, Neutrophils % (Manual) 30 L

, Band Neuts % (Manual) 22 H, Lymphocytes % (Manual) 22, Monocytes % (Manual) 

22 H, Metamyelocytes % (Man) 2 H, Myelocytes % 2 H, Plt Morphology Comment 

Appears Increased H


06/17/20 03:00: Sodium 135 L, Potassium 3.6, Chloride 101, Carbon Dioxide 21 L, 

Anion Gap 17, BUN 58 H, Creatinine 3.48 H, Estimated GFR (MDRD) 13, Glucose 199 

H, Calcium 7.1 L, Magnesium 1.6, Total Bilirubin 2.4 H, AST 52 H, ALT 10, 

Alkaline Phosphatase 1541 H, Serum Total Protein 4.7 L, Albumin 3.0 L, Globulin 

1.7 L, Albumin/Globulin Ratio 1.8


06/16/20 23:53: POC Glucose 140 H


06/16/20 21:32: POC Glucose 116 H


06/16/20 17:33: Troponin I 0.049 H


06/16/20 17:33: Sodium 137, Potassium 3.3 L, Chloride 104, Carbon Dioxide 23, 

Anion Gap 13, BUN 57 H, Creatinine 3.30 H, Estimated GFR (MDRD) 14, Glucose 115

, Calcium 7.3 L, Magnesium 1.8


06/16/20 17:15: Hgb 8.1 L, Hct 25.3 L


06/16/20 17:00: POC Glucose 103


06/16/20 11:06: POC Glucose 105


06/16/20 10:40: PT 16.6 H, INR 1.4, APTT 48.2 H


06/16/20 00:04: Crossmatch See Detail








Status: lab reviewed by me





A/P





- Problem


(1) Acute kidney injury


Current Visit: No   Code(s): N17.9 - ACUTE KIDNEY FAILURE, UNSPECIFIED   Status

: Acute   





(2) Candida UTI


Current Visit: No   Code(s): B37.49 - OTHER UROGENITAL CANDIDIASIS   Status: 

Acute   





(3) Clostridium difficile diarrhea


Current Visit: No   Code(s): A04.72 - ENTEROCOLITIS D/T CLOSTRIDIUM DIFFICILE, 

NOT SPCF AS RECUR   Status: Acute   





(4) Chronic lymphocytic leukemia (CLL), B-cell


Current Visit: No   Code(s): C91.10 - CHRONIC LYMPHOCYTIC LEUK OF B-CELL TYPE 

NOT ACHIEVE REMIS   Status: Chronic   





- Plan


Plan: 





stop Granix 


Cont holding Venetoclax


Pulmonary care as per Pulm: ABG improved quickly, pH now normal, on minimal 

vent settings


nephrostomy tubes in place. 


Cont antibiotics for Proteus as per Dr. Guzman, f/u sensitivities


will follow

## 2020-06-17 NOTE — PRG
DATE OF SERVICE:  06/17/2020



SUBJECTIVE:  The patient is currently intubated in the ICU; however, she is not

sedated.  She is able to nod in response to questions.  She does indicate that her

nephrostomy tubes are uncomfortable and that the stents are still bothering her

bladder. 



PHYSICAL EXAMINATION:

GENERAL:  Intubated. 

CHEST:  Symmetric chest expansion. 

HEART:  Irregular rate and rhythm, currently in AFib. 

ABDOMEN:  Soft, nondistended. 

:  Nephrostomy tube is in good position, draining dark urine with old blood.

Patterson catheter draining dark urine. 

SKIN:  Warm and dry.



LABORATORY DATA:  Creatinine today is 3.48, up from 2.94 this time yesterday.  It

risen from 2.18 to 2.94 over the previous 24 hours.  Urine culture growing Proteus,

pansensitive other than nitrofurantoin.  Blood cultures negative. 



ASSESSMENT AND PLAN:  Sepsis secondary to pyelonephritis due to obstructing

retroperitoneal lymphadenopathy rendering her ureteral stents insufficient.  She did

have bilateral nephrostomy tubes placed yesterday. 



We should know in the coming 24 to 48 hours, how her kidneys will respond, although

the rate of change of her creatinine seems to have improved. 



I will leave antibiotic therapy up to the primary team; however, based on her urine

culture, she should be able to be treated for her pyelonephritis with a single

agent. 



TIME SPENT:  30 minutes spent in the patient's care.







Job ID:  [id number]

## 2020-06-17 NOTE — CON
DATE OF CONSULTATION:  06/16/2020



INDICATION FOR CONSULTATION:  A 65-year-old female who has underwent bilateral

ureteral stents today for bilateral hydronephrosis.  She has a history of CLL 
with

retroperitoneal lymphadenopathy causing obstructive uropathy for which she 
underwent

stent placement in the past.  It appears that some of the stents may have 
occluded.

She again presented with urinary tract infection and urosepsis, at least 
bacteremia

and was found to have I believe Proteus mirabilis in the urine.  Previously, 
she has

had Enterococcus septicemia.  She yesterday evening was transferred from the

step-down unit over to the intensive care unit.  She developed atrial 
fibrillation

with rapid ventricular response.  She also became somewhat hypotensive.  She

apparently was more stable.  She went down and was converted back to sinus 
rhythm.

She went down today to have the stents placed again with uropathy tubes placed 
for

the obstruction.  She was in the prone position and then apparently when she 
came

back to the unit, was in atrial fibrillation with rapid ventricular response 
with

heart rates as much as 180.  When I saw the patient this afternoon in the unit, 
the

heart rate was still in the 170s to almost 200 with atrial fibrillation, rapid

ventricular response.  Her blood pressure was in the 60s.  She had been started 
on

IV amiodarone without bolus.  Her renal function has showed acute renal

insufficiency with a creatinine of 3.3, potassium was 3.3 also, somewhat 
hesitant to

give digoxin, but given the rapid heart rate.  She was given of 0.5 IV digoxin.
  She

continued to have a rapid heart rate.  I then cardioverted her with 100 joules.
  She

then converted again shortly thereafter, is back to atrial fibrillation.  We

continued to give the IV amiodarone.  We gave a slight bolus of the amiodarone 
and

then again waited a few minutes for this to circulate and then again tried to 
shock

again about 120 joules.  She again converted to sinus rhythm with a heart rate 
in

the 60s.  Blood pressure did improve to the 70s and 80s systolically.  She then 
a

few minutes later again converted back to atrial fibrillation.  She was given

additional 0.25 mg of IV digoxin and continued on the IV amiodarone.  I gave 
her 500

mg of IV fluid bolus.  The blood pressure is still not improved.  Eventually 
after

the 2nd dose of digoxin and increasing the IV amiodarone with a slight bolus, 
then

she did convert back to a sinus rhythm with the heart rate in the 60s.  Blood

pressure then subsequently increased up to 96/42, O2 saturations are 97% on the

ventilator.  Respiratory rate is about 13.  At this time, she is much more 
stable.

She was becoming very unstable with blood pressures in the 60s, requiring the

electrocardioversion.  At this time, she remains on the IV amiodarone and 
remains

intubated.  She is arousable, but obviously is somewhat lethargic probably most

likely from the earlier sedation today from the procedures.  Her EKG did show 
some

changes with ST-segment depression of about half a millimeter in the anterior 
leads

with T-wave inversions in the inferior lateral leads.  Her troponin I was 
slightly

elevated at 0.049, which is still indeterminate associated with the elevated

creatinine and renal insufficiency and the rapid heart rate with atrial

fibrillation, I would not be surprised at all with elevated troponin I. 



PAST MEDICAL HISTORY:  Significant for the CLL.  She has had non-Hodgkin's 
lymphoma.

 Apparently, she has had retroperitoneal lymphadenopathy causing obstructive

uropathy.  She has Enterococcus septicemia in the past.  She has C diff in the 
past.

 She has chronic urinary tract infections, which were fungal in nature.  She 
has had

prior ureteral stents placed for obstructive uropathy.  She has diabetes.  She 
has

had some questionable history of myocardial infarction in the past according to 
the

records.  She has dyslipidemia.  She has history of cervical cancer, anxiety and

depression.  She has had an appendectomy, eye surgery, ear surgery, 
tonsillectomy,

hysterectomy, and back surgery. 



SOCIAL HISTORY:  She is happily .  She smoked in the past and no longer

smokes.  There is no alcohol use.  She has 4 children. 



ALLERGIES:  SHE IS ALLERGIC TO BACTRIM, ASPIRIN, PENICILLIN, KEFLEX AND 
DIFLUCAN.



REVIEW OF SYSTEMS:  Not obtainable.  Please refer to the notes already dictated.

The patient is on a ventilator and slightly lethargic.  She has no further 
sedation.

 This apparently was just being worn off from her previous procedure earlier 
today. 



MEDICATIONS:  Prior to admission included: 

1. Atenolol.

2. Zetia.

3. Neurontin.

4. Crestor.

5. Zoloft.

6. Eliquis.

7. Zanaflex.

8. Vfend.

9. Exenatide injections.



MEDICATIONS IN THE HOSPITAL:  Included:

1. Albumin.

2. Tenormin.

3. Aztreonam.

4. Zetia.

5. Neurontin.

6. Insulin as needed.

7. Claritin.

8. Zofran.

9. Crestor.

10. 

11. Zoloft.

12. Ultram.

13. Vancomycin orally.



PHYSICAL EXAMINATION:

GENERAL:  Reveals a middle-aged, elderly female, who is sedated, somewhat 
lethargic,

minimally arousable at this time, may be due to earlier sedation.  Her heart 
rate

earlier when I saw her prior to this dictation was almost 200 beats per minute 
with

atrial fibrillation, is now 66 beats per minute, blood pressure at this time is

94/53, respiratory rate is 12, but she is on the ventilator. 

HEENT:  Shows head to be normocephalic and atraumatic.  Carotid pulses are 
present,

but difficult to auscultate due to the ventilator.  Her chest is actually clear 
to

auscultation, maybe a few basilar rales are noted, otherwise it is relatively 
clear. 

CARDIOVASCULAR:  At this time reveals a regular rate and rhythm.  There were no

gross murmurs noted.  No heaves or thrills. 

ABDOMEN:  Soft, but does not appear to be tender at this time.  I cannot elicit 
any

pain from the patient, but did not do deep palpation.  Did not feel any masses.
  She

does have 2 urostomy tubes coming from the back area. 

EXTREMITIES:  Showed no clubbing, cyanosis, or edema.  Pedal pulses are somewhat

difficult to palpate.  Blood pressure was on the low side and neurologically the

patient is intubated and slightly sedated. 

SKIN:  Warm and dry at this time.



LABORATORY DATA:  Shows a WBC of 2.2, hemoglobin is 8.1, hematocrit 25.3, 
platelet

count is 243,000.  INR is 1.4.  Her sodium was 136 with potassium of 3.3, 
chloride

was 104, bicarb was 23, BUN was 57, and creatinine was 3.3.  Her blood sugar 
was 225

earlier this morning and this afternoon is 115.  Her troponin I was 0.049.  Her

alkaline phosphatase was 1074 with ALT of 7, total bilirubin was 1.5, earlier

troponin I was 0.057.  The first one on admission was 0.01.  EKG as noted 
above. 



IMPRESSION:  

1. Atrial fibrillation with rapid ventricular response, which required IV

amiodarone, IV digoxin and cardioversion x2 to maintain a sinus rhythm.  We will

continue the IV amiodarone at this time.  We would hold off on any further 
digoxin

unless she becomes tachycardic again and we cannot control the heart rate. 

2. Urosepsis.  She is status post bilateral nephrostomy tubes.

3. Positive blood cultures with Proteus mirabilis.

4. Elevated troponin I mostly due to demand ischemia and this is actually 
trending

downward.  It does not appear to be sudden myocardial infarction, but however 
she

does have some EKG changes compatible with some ischemia, but this was with

tachycardia with a heart rate of about 160 beats per minute. 

5. Acute renal insufficiency associated with her obstructive uropathy.

6. Chronic lymphocytic leukemia.  She is being seen by the Oncology service.



  At this time, overall impression is this is an ill lady due to most likely 
urosepsis

and atrial fibrillation, it has been converted to a sinus rhythm.  We will 
continue

to monitor her and watch her blood pressure and she may need further IV fluids.
  She

did get 500 mL of fluid bolus. 

Total time I spent with this lady in the intensive care unit was an hour and a 
half

of acute direct care with this lady managing her blood pressure and heart rate 
as

well as electrical cardioversion and managing all her other medical problems at 
this time and evaluation of the

medical records and medication adjustments. 







Job ID:  [id number]



MTDD

## 2020-06-17 NOTE — EKG
Test Reason : 

Blood Pressure : ***/*** mmHG

Vent. Rate : 160 BPM     Atrial Rate : 125 BPM

   P-R Int : 000 ms          QRS Dur : 084 ms

    QT Int : 268 ms       P-R-T Axes : 000 -09 219 degrees

   QTc Int : 437 ms

 

Atrial fibrillation with rapid ventricular response with premature ventricular or aberrantly conducte
d complexes

Low voltage QRS





Abnormal ECG

Confirmed by DR. ABE NARAYAN ([id number]) on 6/17/2020 7:18:50 PM

 

Referred By:             Confirmed By:DR. ABE NARAYAN

## 2020-06-18 VITALS — DIASTOLIC BLOOD PRESSURE: 43 MMHG | SYSTOLIC BLOOD PRESSURE: 104 MMHG

## 2020-06-18 VITALS — TEMPERATURE: 98.7 F

## 2020-06-18 RX ADMIN — VANCOMYCIN HYDROCHLORIDE SCH MG: KIT at 00:03

## 2020-06-18 NOTE — PDOC.HOSPP
- Subjective


Encounter Date: 06/17/20


Encounter Time: 13:00


non-verbal


Subjective: 





Patient seen and examined for Sepsis/resp failure. On Amiodarone/Levophed drip. 

No new complaints. No overnight events





- Objective


Vital Signs & Weight: 


 Vital Signs (12 hours)











  Temp Pulse Resp Pulse Ox


 


 06/18/20 06:00  98.7 F  46 L  2 L  67 L


 


 06/18/20 04:00     92 L


 


 06/18/20 01:34     94 L








 Weight











Admit Weight                   147 lb 14.4 oz


 


Weight                         147 lb 14.4 oz











 Most Recent Monitor Data











Heart Rate from ECG            70


 


NIBP                           117/57


 


NIBP BP-Mean                   77


 


Respiration from ECG           19


 


SpO2                           96














I&O: 


 











 06/17/20 06/18/20 06/19/20





 06:59 06:59 06:59


 


Intake Total 5983 60 


 


Output Total 523 385 


 


Balance 5460 -325 











Result Diagrams: 


 06/17/20 03:00





 06/17/20 03:00


Additional Labs: 


 Accuchecks











  06/17/20 06/17/20 06/17/20





  22:15 17:06 11:38


 


POC Glucose  316 H  351 H  331 H











Radiology Reviewed by me: Yes (CXR - no infiltrate)


EKG Reviewed by me: Yes (Tele SR with intermittent Afib)





Hospitalist ROS





- Review of Systems


ROS unobtainable: due to mental status





- Exam


General Appearance: ill appearing


General - other findings: on Vent


Heart: RRR, no gallops, no rubs, normal peripheral pulses


Respiratory: no wheezes, no rales, rhonchi


Gastrointestinal: soft, normal bowel sounds, no guarding, no rigidity


Extremities: no cyanosis, no clubbing


Psychiatric: lethargic


Psychiatric - other findings: Neuro/Psych - cannot assess due to current 

condition





Hosp A/P





- Plan


DVT proph w/SCDs





Severe sepsis/Septic shock due to Proteus UTI and bacteremia - POA


Acute hypoxic resp failure req mech vent


Afib with RVR - on Amiodarone drip


Obstructive uropathy 


Anemia prob due to CLL s/p 1 unit PRBC


MULUGETA - worsening


Metabolic acidosis due to sepsis


DM2


CLL


Other issues per previous notes





PLAN:


Cont Azactam


Cont IVF with Bicarb


s/p Nephrostomy tube


Cont Vent support


Cont Pressors


Cont Amiodarone drip


Cont sliding scale


Onc/Critical care/Cardio input appreciated


AM labs

## 2020-06-18 NOTE — DIS
DATE OF ADMISSION:  2020



DATE OF DISCHARGE:  2020



DEATH SUMMARY



DATE :   at 6:26 a.m.



BRIEF HOSPITAL COURSE:  The patient was a 65-year-old female with CLL and recurrent

UTIs, status post bilateral stents placement, presented to the hospital with

generalized weakness, fever, abdominal discomfort along with nausea and vomiting on

.  She was admitted to the hospital with a diagnosis of sepsis

secondary to urinary tract infection.  Due to multiple drug allergies, she was

started on aztreonam.  She received 1 dose of vancomycin in the emergency room.  The

patient was evaluated by Urology, Dr. Bahena.  COVID-19 came back negative.  The

patient underwent bilateral nephrostomy tube placement under ultrasound guidance.  A

blood culture and urine culture were consistent with Proteus.  The patient then

developed atrial fibrillation with rapid ventricular response with hypotension.  She

was started on amiodarone.  Later on, the patient became hypotensive and was started

on Levophed.  She was also followed by Palliative Care Team.  Since last night, the

patient started to have rhythm abnormality with heart rate dropping in 20s.  She was

started on dopamine drip per Cardiology recommendation.  The patient then went into

ventricular tachycardia and was placed on transcutaneous pacing.  Family decided to

make her DNR.  This morning, the patient passed away around 6:26 a.m. 



FINAL DIAGNOSES:  

1. Severe sepsis/septic shock secondary to complicated Proteus urinary tract

infection with Proteus bacteremia. 

2. Acute hypoxic respiratory failure, requiring mechanical ventilation.

3. Atrial fibrillation with rapid ventricular response, requiring cardioversion and

amiodarone. 

4. Obstructive uropathy with bilateral ureteral stents.  The patient underwent

bilateral nephrostomy tube. 

5. Anemia due to chronic lymphocytic leukemia, status post 1 unit of PRBC this

admission. 

6. Worsening acute kidney injury.

7. Metabolic acidosis.

8. Diabetes mellitus type 2.

9. Fibromyalgia.

10. Anxiety.







Job ID:  [id number]

## 2020-06-18 NOTE — CON
DATE OF CONSULTATION:  



REASON FOR CONSULTATION:  Elevated creatinine.



HISTORY OF PRESENT ILLNESS:  This is a 65-year-old female, who

presented to the hospitalist with CLL with chronic UTI and bilateral ureteral

obstruction.  Her creatinine was 1.5 on May 11th, which increased to 2.3 and is 
3.4

today, so I was consulted.  The patient is being followed by Urology.  



PAST MEDICAL HISTORY:  Recurrent UTI, CLL, cervical cancer, MI, migraine, 
anxiety,

depression. 



SOCIAL HISTORY:  No alcohol or drug use.



FAMILY HISTORY:  Negative for ESRD.



ALLERGIES:  REVIEWED.



HOME MEDICATION LIST:  Reviewed.



HOSPITAL MEDICATION LIST:  Reviewed.



REVIEW OF SYSTEMS:  Unobtainable.



PHYSICAL EXAMINATION:

General:  The patient is resting. 

Vital Signs:  Afebrile, pulse 75, breathing at 16, blood pressure 136/48. 

HEENT:  Head normocephalic and atraumatic.  Eyes intact, no ulcers.  Nose intact
, no

ulcers.  Ears intact, no ulcers. 

Neck:  Supple.  No JVD. 

Chest:  Symmetrical and clear. 

Cardiovascular:  Shows S1 and S2, no rub, no murmur. 

Gastrointestinal:  Abdomen is soft, bowel sounds positive. 

Extremities:  Show no edema or ulcers. 

Skin:  Shows no rash or petechiae. 

Musculoskeletal:  Shows no joint swelling or stiffness. 

Genitourinary:  Shows no Patterson or CVA tenderness. 

Neurologic:  The patient is resting.



LABORATORY DATA:  Reviewed.



ASSESSMENT AND RECOMMENDATIONS:  

1. Acute kidney injury with chronic kidney disease, most likely due to multiple

etiologies. 

2. Hematological malignancy as well as infectious etiology in the setting of 
acute

tubular necrosis.  Overall prognosis is poor.  No indication for dialysis. 

3. Hypertension, stable.

4. Medication based on GFR appropriate.







Job ID:  [id number]



[facility]

## 2020-06-20 NOTE — EKG
Test Reason : 

Blood Pressure : ***/*** mmHG

Vent. Rate : 091 BPM     Atrial Rate : 091 BPM

   P-R Int : 114 ms          QRS Dur : 080 ms

    QT Int : 354 ms       P-R-T Axes : 031 -16 038 degrees

   QTc Int : 435 ms

 

Normal sinus rhythm

Normal ECG

 

Confirmed by STEWART VOGEL M.D. ([id number]),  MONIK YOUNG ([id number]) on 6/20/2020 2:25:52 PM

 

Referred By:             Confirmed By:STEWART VOGEL M.D.

## 2020-06-22 NOTE — PQF
[employer] Josaf[employer] ViewerCARRELL,DOROTHY ANGIE LUNA MD

[id number]                                                             [facility]

[id number]                             

                                   

CLINICAL DOCUMENTATION CLARIFICATION FORM:  POST DISCHARGE



Addendum to original discharge summary date:  __________________________________
____



Late entry note date:  _________________________________________________________
__











DATE:  6/22/2020                                                             
ATTN:  Angie Phillips



Please exercise your independent, professional judgment in responding to the 
clarification form. 

Clinical indicators are provided on the bottom of this form for your review



Please check appropriate box(s):

[ x ] UTI is associated with Patterson catheter

[  ] UTI is not associated with Patterson catheter

[  ] UTI is associated with Ureteral stent

[  ] UTI is not associated with Ureteral stent

[  ] Other diagnosis ___________

[  ] Unable to determine



In addition, please specify:

Present on Admission (POA):  [ x] Yes             [  ] No             [  ] 
Unable to determine



For continuity of documentation, please document condition throughout progress 
notes and discharge summary.  Thank You.



CLINICAL INDICATORS - SIGNS / SYMPTOMS / LABS

Hydronephrosis, UTI, ureteral obstruction.  I have asked that a Patterson replaced 
yesterday in the emergency room to see how her creatinine and hydronephrosis 
respond.  If hydronephrosis does not improve with Patterson catheter drainage, she 
will likely require percutaneous nephrostomy tubes. Afterwards, we can remove 
her stents - Consult 6/15 by Garry Bahena MD

Given that patient is now developing worsening renal function despite 
indwelling stents and Patterson catheter with ultrasound showing persistent 
hydronephrosis, I recommended bilateral nephrostomy tubes - PN 6/16 by  
Garry Bahena MD

Ureteral stents will remain even after nephrostomy tube placement until she is 
clinically more stable. I do not think that her sepsis secondary to urinary 
tract infection will improve with antibiotic treatment only, requires drainage 
of her renal units - PN 6/16 by  Garry Bahena MD

Septic shock secondary to complicated Proteus urinary tract infection with 
Proteus bacteremia - Discharge summary



RISK FACTORS

Patient with ureteral stents and Patterson catheter - Consult 6/15 by Garry Bahena MD

Persistent hydronephrosis - PN 6/16 by  Garry Bahena MD 



TREATMENT:

Percutaneous bilateral nephrostomy tubes - Procedure note 6/16 by Kenan Snow MD

IV fluids - Medications

IV antibiotics - Medications





(This form is maintained as a part of the permanent medical record)

2015 [employer].  All Rights Reserved

Robin [email or web address]    [phone]

                                                              



 



MTDD